# Patient Record
Sex: MALE | Race: WHITE | Employment: OTHER | URBAN - METROPOLITAN AREA
[De-identification: names, ages, dates, MRNs, and addresses within clinical notes are randomized per-mention and may not be internally consistent; named-entity substitution may affect disease eponyms.]

---

## 2017-06-17 ENCOUNTER — OFFICE VISIT (OUTPATIENT)
Dept: URGENT CARE | Facility: URGENT CARE | Age: 32
End: 2017-06-17
Payer: COMMERCIAL

## 2017-06-17 VITALS
RESPIRATION RATE: 14 BRPM | HEIGHT: 67 IN | TEMPERATURE: 98 F | BODY MASS INDEX: 28.25 KG/M2 | WEIGHT: 180 LBS | SYSTOLIC BLOOD PRESSURE: 102 MMHG | HEART RATE: 84 BPM | DIASTOLIC BLOOD PRESSURE: 64 MMHG

## 2017-06-17 DIAGNOSIS — S61.208A AVULSION OF SKIN OF INDEX FINGER, INITIAL ENCOUNTER: Primary | ICD-10-CM

## 2017-06-17 PROCEDURE — 99212 OFFICE O/P EST SF 10 MIN: CPT | Performed by: FAMILY MEDICINE

## 2017-06-17 NOTE — MR AVS SNAPSHOT
After Visit Summary   6/17/2017    Ilia Larios    MRN: [id number]           Patient Information     Date Of Birth          1985        Visit Information        Provider Department      6/17/2017 8:25 PM Miguelangel Rojas MD [facility]        Today's Diagnoses     Avulsion of skin of index finger, initial encounter    -  1      Care Instructions      Skin Tear (Skin Avulsion)  A skin avulsion is a tearing of the top layer of skin. This commonly happens after a fall or other injury. It also tends to be more common in older people, or those taking blood thinners or steroids for long periods of time.  Home care  These guidelines will help you care for your wound at home:    Keep the wound clean and dry for the first 24 to 48 hours, or as your healthcare provider advises.    If there is a dressing or bandage, change it when it gets wet or dirty. Otherwise, leave it on for the first 24 hours, then change it once a day or as often as the doctor says.    If stitches or staples were used, check the wound every day.    After taking off the dressing, wash the area gently with soap and water. Clean as close to the stitches as you can. Avoid washing or rubbing the stitches directly.    After 3 days you can keep the bandages off the wound, unless told otherwise, or there is continued drainage.  Allow the wound to be open to the air.    Keep a thin layer of antibiotic ointment on the cut. This will keep the wound clean, make it easier to remove the stitches, and reduce scarring.    If your wound is oozing, you can put a nonstick dressing over it. Then, reapply the bandage or dressing as you were told.    You can shower as usual after the first 24 hours, but don't soak the area in water (no baths or swimming) until the stitches or staples are taken out.    If surgical tape was used, keep the area clean and dry. If it becomes wet, blot it dry with a clean towel.    If skin glue was used,  don't put any creams, lotions, or antibiotic ointments on it. These can dissolve the glue. Usually the glue will flake off in about 5 to 10 days by itself. Try to resist picking it off before that so the wound doesn't open up. When it gets wet, pat it dry.  Here is some information about medicine:    You may use over-the-counter medicine such as acetaminophen or ibuprofen to control pain, unless another pain medicine was given. If you have chronic liver or kidney disease or ever had a stomach ulcer or gastrointestinal bleeding, talk with your doctor before using these medicines.    If you were given antibiotics, take them until they are all used up. It is important to finish the antibiotics even if the wound looks better. This will ensure that the infection has cleared.  Follow-up care  Follow up with your healthcare provider, or as advised.    Watch for any signs of infection, such as increasing redness, swelling, or pus coming out. If this happens, don't wait for your scheduled visit. Instead, see a doctor sooner.    Stitches or staples are usually taken out within 5 to 14 days. This varies depending on what part of your body they are on, and the type of wound. The doctor will tell you how long stitches should be left in.     If surgical tape was used, it is usually left on for 7 to 10 days. You can remove surgical tape after that unless you were told otherwise. If you try to remove it, and it is too hard, soaking can help. Surgical tape strips will eventually fall off on their own. If the edges of the cut pull apart, stop removing the tape or strips and follow up with your doctor    As mentioned above, skin glue will flake off by itself in 5 to 10 days, so you don't need to pull it off.  If any X-rays were done, you will be notified of any changes that may affect your care.  When to seek medical advice  Call your healthcare provider right away if any of these occur:    Increasing pain in the wound    Redness,  "swelling, or pus coming from the wound    Fever of 100.4 F (38 C) or higher, or as directed by your healthcare provider    Sutures or staples come apart or fall out before your next appointment and the wound edges look as if they will re-open    Surgical tape closures fall off before 7 days, and the wound edges look as if they will re-open    Bleeding not controlled by direct pressure  Date Last Reviewed: 9/1/2016 2000-2017 [employer]. [address] [place] [address]. All rights reserved. This information is not intended as a substitute for professional medical care. Always follow your healthcare professional's instructions.                Follow-ups after your visit        Who to contact     If you have questions or need follow up information about today's clinic visit or your schedule please contact [facility] directly at [phone].  Normal or non-critical lab and imaging results will be communicated to you by MyChart, letter or phone within 4 business days after the clinic has received the results. If you do not hear from us within 7 days, please contact the clinic through kidthinghart or phone. If you have a critical or abnormal lab result, we will notify you by phone as soon as possible.  Submit refill requests through [employer] or call your pharmacy and they will forward the refill request to us. Please allow 3 business days for your refill to be completed.          Additional Information About Your Visit        MyChart Information     [employer] lets you send messages to your doctor, view your test results, renew your prescriptions, schedule appointments and more. To sign up, go to [email or web address] . Click on \"Log in\" on the left side of the screen, which will take you to the Welcome page. Then click on \"Sign up Now\" on the right side of the page.     You will be asked to enter the access code listed below, as well as some personal information. Please " "follow the directions to create your username and password.     Your access code is: [email or web address]  Expires: 9/15/2017  8:57 PM     Your access code will  in 90 days. If you need help or a new code, please call your [place] clinic or [phone].        Care EveryWhere ID     This is your Care EveryWhere ID. This could be used by other organizations to access your [place] medical records  [id number]        Your Vitals Were     Pulse Temperature Respirations Height BMI (Body Mass Index)       84 98  F (36.7  C) 14 5' 7\" (1.702 m) 28.19 kg/m2        Blood Pressure from Last 3 Encounters:   17 102/64    Weight from Last 3 Encounters:   17 180 lb (81.6 kg)              Today, you had the following     No orders found for display       Primary Care Provider    None Specified       No primary provider on file.        Thank you!     Thank you for choosing [facility]  for your care. Our goal is always to provide you with excellent care. Hearing back from our patients is one way we can continue to improve our services. Please take a few minutes to complete the written survey that you may receive in the mail after your visit with us. Thank you!             Your Updated Medication List - Protect others around you: Learn how to safely use, store and throw away your medicines at www.disposemymeds.org.      Notice  As of 2017  8:57 PM    You have not been prescribed any medications.      "

## 2017-06-18 NOTE — PATIENT INSTRUCTIONS
Skin Tear (Skin Avulsion)  A skin avulsion is a tearing of the top layer of skin. This commonly happens after a fall or other injury. It also tends to be more common in older people, or those taking blood thinners or steroids for long periods of time.  Home care  These guidelines will help you care for your wound at home:    Keep the wound clean and dry for the first 24 to 48 hours, or as your healthcare provider advises.    If there is a dressing or bandage, change it when it gets wet or dirty. Otherwise, leave it on for the first 24 hours, then change it once a day or as often as the doctor says.    If stitches or staples were used, check the wound every day.    After taking off the dressing, wash the area gently with soap and water. Clean as close to the stitches as you can. Avoid washing or rubbing the stitches directly.    After 3 days you can keep the bandages off the wound, unless told otherwise, or there is continued drainage.  Allow the wound to be open to the air.    Keep a thin layer of antibiotic ointment on the cut. This will keep the wound clean, make it easier to remove the stitches, and reduce scarring.    If your wound is oozing, you can put a nonstick dressing over it. Then, reapply the bandage or dressing as you were told.    You can shower as usual after the first 24 hours, but don't soak the area in water (no baths or swimming) until the stitches or staples are taken out.    If surgical tape was used, keep the area clean and dry. If it becomes wet, blot it dry with a clean towel.    If skin glue was used, don't put any creams, lotions, or antibiotic ointments on it. These can dissolve the glue. Usually the glue will flake off in about 5 to 10 days by itself. Try to resist picking it off before that so the wound doesn't open up. When it gets wet, pat it dry.  Here is some information about medicine:    You may use over-the-counter medicine such as acetaminophen or ibuprofen to control pain,  unless another pain medicine was given. If you have chronic liver or kidney disease or ever had a stomach ulcer or gastrointestinal bleeding, talk with your doctor before using these medicines.    If you were given antibiotics, take them until they are all used up. It is important to finish the antibiotics even if the wound looks better. This will ensure that the infection has cleared.  Follow-up care  Follow up with your healthcare provider, or as advised.    Watch for any signs of infection, such as increasing redness, swelling, or pus coming out. If this happens, don't wait for your scheduled visit. Instead, see a doctor sooner.    Stitches or staples are usually taken out within 5 to 14 days. This varies depending on what part of your body they are on, and the type of wound. The doctor will tell you how long stitches should be left in.     If surgical tape was used, it is usually left on for 7 to 10 days. You can remove surgical tape after that unless you were told otherwise. If you try to remove it, and it is too hard, soaking can help. Surgical tape strips will eventually fall off on their own. If the edges of the cut pull apart, stop removing the tape or strips and follow up with your doctor    As mentioned above, skin glue will flake off by itself in 5 to 10 days, so you don't need to pull it off.  If any X-rays were done, you will be notified of any changes that may affect your care.  When to seek medical advice  Call your healthcare provider right away if any of these occur:    Increasing pain in the wound    Redness, swelling, or pus coming from the wound    Fever of 100.4 F (38 C) or higher, or as directed by your healthcare provider    Sutures or staples come apart or fall out before your next appointment and the wound edges look as if they will re-open    Surgical tape closures fall off before 7 days, and the wound edges look as if they will re-open    Bleeding not controlled by direct pressure  Date  Last Reviewed: 9/1/2016 2000-2017 [employer]. [address] [place] [address], [place] [address]. All rights reserved. This information is not intended as a substitute for professional medical care. Always follow your healthcare professional's instructions.

## 2017-06-18 NOTE — PROGRESS NOTES
"SUBJECTIVE:  Ilia Larios, a 31 year old male scheduled an appointment to discuss the following issues:  Avulsion of skin of index finger, initial encounter    Medical, social, surgical, and family histories reviewed.        ROS:  See HPI.  No nausea/vomiting.  No fever/chills.  No chest pain/SOB.  No BM/urine problems.  No syncope.      OBJECTIVE:  /64  Pulse 84  Temp 98  F (36.7  C)  Resp 14  Ht 5' 7\" (1.702 m)  Wt 180 lb (81.6 kg)  BMI 28.19 kg/m2  EXAM:  GENERAL APPEARANCE: alert and mild distress  EYES: Eyes grossly normal to inspection, PERRL and conjunctivae and sclerae normal  HENT: ear canals and TM's normal and nose and mouth without ulcers or lesions  NECK: no adenopathy, no asymmetry, masses, or scars and thyroid normal to palpation  RESP: lungs clear to auscultation - no rales, rhonchi or wheezes  CV: regular rates and rhythm, normal S1 S2, no S3 or S4 and no murmur, click or rub  LYMPHATICS: normal ant/post cervical and supraclavicular nodes  ABDOMEN: soft, nontender, without hepatosplenomegaly or masses and bowel sounds normal  MS: extremities normal- no gross deformities noted  SKIN: no suspicious lesions or rashes  NEURO: Normal strength and tone, mentation intact and speech normal    ASSESSMENT/PLAN:  (V70.895E) Avulsion of skin of index finger, initial encounter  (primary encounter diagnosis)  Plan:       "

## 2017-06-18 NOTE — NURSING NOTE
"Chief Complaint   Patient presents with     Urgent Care     Laceration     cut to right 2nd finger about 2 hours ago, piece missing of finger. Last td       Initial /64  Pulse 84  Temp 98  F (36.7  C)  Resp 14  Ht 5' 7\" (1.702 m)  Wt 180 lb (81.6 kg)  BMI 28.19 kg/m2 Estimated body mass index is 28.19 kg/(m^2) as calculated from the following:    Height as of this encounter: 5' 7\" (1.702 m).    Weight as of this encounter: 180 lb (81.6 kg).  Medication Reconciliation: complete  "

## 2017-06-25 NOTE — PROGRESS NOTES
"SUBJECTIVE:  Ilia Larios, a 31 year old male scheduled an appointment to discuss the following issues:  Avulsion of skin of index finger, initial encounter    Medical, social, surgical, and family histories reviewed.       Urgent Care      Laceration  cut to right 2nd finger about 2 hours ago, piece missing of finger. Last td      While using a metal tool, accidentally sliced off a superficial piece of skin (1cm in diameter) at ventral aspect of right index finger.  Bleeding has stopped with pressure.  Last Tdap 2 years ago.  No loss of function of fingers, normal movement.  No head/neck/trunk injuries.    ROS:  See HPI.  No nausea/vomiting.  No fever/chills.  No chest pain/SOB.  No BM/urine problems.  No syncope.      OBJECTIVE:  /64  Pulse 84  Temp 98  F (36.7  C)  Resp 14  Ht 5' 7\" (1.702 m)  Wt 180 lb (81.6 kg)  BMI 28.19 kg/m2  EXAM:  GENERAL APPEARANCE: alert and mild distress  EYES: Eyes grossly normal to inspection, PERRL and conjunctivae and sclerae normal  HENT: ear canals and TM's normal and nose and mouth without ulcers or lesions  NECK: no adenopathy, no asymmetry, masses, or scars and thyroid normal to palpation  RESP: lungs clear to auscultation - no rales, rhonchi or wheezes  CV: regular rates and rhythm, normal S1 S2, no S3 or S4 and no murmur, click or rub  MS & SKIN: right index finger distal phalynx volar aspect showed a longitudinal wound about 1cm in size, dermal depth skin avulsion---this wound was cleansed with clean running tap water and sterilized with Hibicleanse; gel foam applied with some pressure and adaptic dry non-stick sterile finger dressing applied pt able to move right index finger and other fingers normally.  No tendon involvement.  NEURO: Normal strength and tone, mentation intact and speech normal    ASSESSMENT/PLAN:  (Q37.448A) Avulsion of skin of index finger, initial encounter  (primary encounter diagnosis)  Plan:  Wound cleansed and gel foam applied, dry " adaptic dressing applied  Wound care instructions given. Pt to f/up PCP within 1 week for recheck, sooner if no improvement or worsening.  Warning signs and symptoms explained.

## 2024-04-11 ENCOUNTER — TRANSCRIBE ORDERS (OUTPATIENT)
Dept: OTHER | Age: 39
End: 2024-04-11

## 2024-04-11 DIAGNOSIS — R25.3 FASCICULATIONS OF MUSCLE: ICD-10-CM

## 2024-04-11 DIAGNOSIS — G62.89 PERIPHERAL MOTOR NEUROPATHY: Primary | ICD-10-CM

## 2024-04-11 DIAGNOSIS — R29.898 WEAKNESS OF LEFT UPPER EXTREMITY: ICD-10-CM

## 2024-04-12 NOTE — TELEPHONE ENCOUNTER
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 Swan   DATE OF APPT: 4/15/24   NOTES (FOR ALL VISITS) STATUS DETAILS   OFFICE NOTE from referring provider Care Everywhere Dr King Sullivan @ [facility]:  4/8/24   EMG Care Everywhere [facility]:  4/9/24   MEDICATION LIST Care Everywhere    IMAGING  (FOR ALL VISITS)     MRI (HEAD, NECK, SPINE) N/A    CT (HEAD, NECK, SPINE) N/A

## 2024-04-15 ENCOUNTER — DOCUMENTATION ONLY (OUTPATIENT)
Dept: LAB | Facility: CLINIC | Age: 39
End: 2024-04-15

## 2024-04-15 ENCOUNTER — OFFICE VISIT (OUTPATIENT)
Dept: NEUROLOGY | Facility: CLINIC | Age: 39
End: 2024-04-15
Attending: FAMILY MEDICINE
Payer: COMMERCIAL

## 2024-04-15 ENCOUNTER — PRE VISIT (OUTPATIENT)
Dept: NEUROLOGY | Facility: CLINIC | Age: 39
End: 2024-04-15

## 2024-04-15 ENCOUNTER — LAB (OUTPATIENT)
Dept: LAB | Facility: CLINIC | Age: 39
End: 2024-04-15
Payer: COMMERCIAL

## 2024-04-15 VITALS
RESPIRATION RATE: 14 BRPM | HEART RATE: 95 BPM | BODY MASS INDEX: 27.5 KG/M2 | SYSTOLIC BLOOD PRESSURE: 125 MMHG | HEIGHT: 67 IN | WEIGHT: 175.2 LBS | DIASTOLIC BLOOD PRESSURE: 79 MMHG | OXYGEN SATURATION: 100 %

## 2024-04-15 DIAGNOSIS — G12.20 MOTOR NEURON DISEASE OR SYNDROME (H): ICD-10-CM

## 2024-04-15 DIAGNOSIS — G12.20 MOTOR NEURON DISEASE OR SYNDROME (H): Primary | ICD-10-CM

## 2024-04-15 LAB
Lab: NORMAL
Lab: NORMAL
PERFORMING LABORATORY: NORMAL
PERFORMING LABORATORY: NORMAL
SPECIMEN STATUS: NORMAL
SPECIMEN STATUS: NORMAL
TEST NAME: NORMAL
TEST NAME: NORMAL

## 2024-04-15 PROCEDURE — 84182 PROTEIN WESTERN BLOT TEST: CPT | Performed by: PATHOLOGY

## 2024-04-15 PROCEDURE — 83825 ASSAY OF MERCURY: CPT | Mod: 90 | Performed by: PATHOLOGY

## 2024-04-15 PROCEDURE — 83520 IMMUNOASSAY QUANT NOS NONAB: CPT | Performed by: PATHOLOGY

## 2024-04-15 PROCEDURE — 87389 HIV-1 AG W/HIV-1&-2 AB AG IA: CPT | Performed by: PSYCHIATRY & NEUROLOGY

## 2024-04-15 PROCEDURE — 36415 COLL VENOUS BLD VENIPUNCTURE: CPT | Performed by: PATHOLOGY

## 2024-04-15 PROCEDURE — 99000 SPECIMEN HANDLING OFFICE-LAB: CPT | Performed by: PATHOLOGY

## 2024-04-15 PROCEDURE — 83516 IMMUNOASSAY NONANTIBODY: CPT | Mod: 90 | Performed by: PATHOLOGY

## 2024-04-15 PROCEDURE — 86038 ANTINUCLEAR ANTIBODIES: CPT | Performed by: PSYCHIATRY & NEUROLOGY

## 2024-04-15 PROCEDURE — 86334 IMMUNOFIX E-PHORESIS SERUM: CPT | Mod: 26 | Performed by: PATHOLOGY

## 2024-04-15 PROCEDURE — 82175 ASSAY OF ARSENIC: CPT | Mod: 90 | Performed by: PATHOLOGY

## 2024-04-15 PROCEDURE — 82525 ASSAY OF COPPER: CPT | Mod: 90 | Performed by: PATHOLOGY

## 2024-04-15 PROCEDURE — 86255 FLUORESCENT ANTIBODY SCREEN: CPT | Performed by: PATHOLOGY

## 2024-04-15 PROCEDURE — 86334 IMMUNOFIX E-PHORESIS SERUM: CPT | Performed by: PATHOLOGY

## 2024-04-15 PROCEDURE — G2211 COMPLEX E/M VISIT ADD ON: HCPCS | Performed by: PSYCHIATRY & NEUROLOGY

## 2024-04-15 PROCEDURE — 99204 OFFICE O/P NEW MOD 45 MIN: CPT | Mod: GC | Performed by: PSYCHIATRY & NEUROLOGY

## 2024-04-15 PROCEDURE — 83655 ASSAY OF LEAD: CPT | Mod: 90 | Performed by: PATHOLOGY

## 2024-04-15 RX ORDER — IBUPROFEN 400 MG/1
400 TABLET, FILM COATED ORAL EVERY 6 HOURS PRN
COMMUNITY

## 2024-04-15 RX ORDER — PSYLLIUM HUSK/CALCIUM CARB 1 G-60 MG
1 CAPSULE ORAL DAILY
COMMUNITY

## 2024-04-15 RX ORDER — CETIRIZINE HYDROCHLORIDE 10 MG/1
10 TABLET ORAL DAILY PRN
COMMUNITY

## 2024-04-15 ASSESSMENT — PAIN SCALES - GENERAL: PAINLEVEL: MILD PAIN (2)

## 2024-04-15 NOTE — PATIENT INSTRUCTIONS
Your neurological examination today did show signs of weakness in the left hand on the right leg, abnormal reflexes, and fasciculations.  There could be numerous disorders that cause this, including some treatable ones, such as Lyme disease, autoimmune disorders, spinal cord disorders, heavy metal toxicity, HIV, etc.    Another possibility is a degenerative neurological disorder called amyotrophic lateral sclerosis (ALS), or Klaudia Gehrig disease.    We will run numerous blood test today, and I would like you to get an MRI of the thoracic and lumbar spine with contrast tomorrow, in addition to the cervical spine MRI.    I would like you to come back to our clinic around May 2 to discuss the results, final diagnosis, and plan of care.

## 2024-04-15 NOTE — PROGRESS NOTES
[facility]  Neurology Consultation Note - Neuromuscular Division    Patient Name:  Ilia Larios    MRN:  [id number]   :  1985  Date of Service:  April 15, 2024  Primary care provider:  King Sullivan      Consult requested by:   King Sullivan MD  [address] [place] [address]    CC: Weakness    History of Present Illness:   lIia Larios is a 38 year old man who presents to the [facility] neuromuscular clinic in consultation for weakness and reported abundant fasciculations on recent EMG performed just 1 week ago.  He does not have any remarkable past medical history.    At baseline he is a very active and fit gentleman that was in his relatively normal health until about January or 2024.  Since that time he describes loss of dexterity and inability to perform some fine motor tasks with the left hand.  Specific things include grabbing coins off of the table or out of his pocket, gripping a fingernail clipper, or untying shoes.  He still has the ability to raise things over his head and no trouble with sustained activities against gravity with his arms.  He also describes using the Peloton bike frequently and having more difficulty with hip flexion and using powerful movements with his right leg in the last 1 to 2 months.  He also has been a few episodes of tripping on his right foot and stumbling due to right foot weakness in the last month.  He cannot specifically say how much it has changed in the last few months or compare it to when it started.  In the last 1 month he has noticed marked twitching and quivering of his left arm and to a lesser degree some moderate twitching of the right arm and right leg.  He denies any injuries or trauma that led to these events.  He denies any sensory changes such as paresthesias, numbness, or pain.  He denies bowel or bladder incontinence.  He denies changes to chewing, swallowing, choking  episodes, or weight loss.  He says his breathing has been normal and denies orthopnea.  His voice is also stable and denies slurring of words or dysarthria.  Denies drooling or extra secretions.    He works as a realtor.  There is no significant past medical history with the exception of anxiety in the last year or 2, which is worse in the last few months with his development of weakness.  Denies family history of any neurologic or myopathic disorders, and no gait impairment or severe dysfunction in any family members.  He does not smoke.  He drinks about 10 drinks of alcohol each week, usually Thursday through Sunday, and has done this for at least a year.    He underwent an EMG just 1 week prior through the [facility] system.  Independent review of this study shows normal antidromic sensory nerve conduction studies of left median, left ulnar, left radial, right sural, and right superficial peroneal nerves.  CMAP's of the left median (APB), left ulnar (ADM), right peroneal (EDB), and right tibial (AH) nerves were normal without changes to amplitude, velocity, or evidence of conduction block.  Needle EMG showed continuous fasciculations in the left deltoid, biceps brachii, triceps, left vastus lateralis, and 1+ fasciculations in the left thoracic paraspinal musculature.  Fibrillations and positive sharp waves are noted in the left tibialis anterior and medial gastrocnemius.  Neurogenic motor unit remodeling was noted during volitional activity in the left deltoid, triceps, tibialis anterior, and medial gastrocnemius.  High cervical and bulbar musculature was not tested.      ROS: A 10-point ROS was performed as per HPI.    PMH:  No past medical history on file.  No past surgical history on file.    Allergies:  No Known Allergies    Medications:    No current outpatient medications on file.    Social History:  Social History     Tobacco Use    Smoking status: Never    Smokeless tobacco: Not on file   Substance Use  "Topics    Alcohol use: Not on file       Family History:    No family history on file.      Physical Examination  Vitals: /79 (BP Location: Right arm, Patient Position: Sitting, Cuff Size: Adult Regular)   Pulse 95   Resp 14   Ht 1.7 m (5' 6.93\")   Wt 79.5 kg (175 lb 3.2 oz)   SpO2 100%   BMI 27.50 kg/m      General: Alert, interactive, moderate distress due to anxiety  HEENT: Normocephalic, atraumatic, nares patent, no oral lesions   Pulm: No increased work of breathing   Extremities: Warm and well perfused, peripheral pulses present, no edema  Musculoskeletal: No deformities of feet, hands, or limbs  Skin: Not jaundiced, no rash, but does have some moderate blanching of the skin in the hands and wrists bilaterally  Psych: Anxious    Neurologic:   Mental status: Attentive, interactive; Recalls remote and recent history with accuracy   Speech/Language: Fluent speech without paraphasic errors; No dysarthria  Cranial nerves: Visual fields intact to confrontation, Eyes conjugate on neutral gaze, Pupils 4mm and brisk, EOMI w/ normal and smooth pursuit, face expression symmetric, hearing intact to conversation, head turn strong bilaterally, palate rise symmetric, tongue/uvula midline.  Eye closure normal, lip closure normal, cheek puff normal, jaw opening normal strength.  Jaw jerk was mildly present, fasciculations of the tongue were absent.  Movement of the tongue was normal and quick in lateral directions.  No dysarthria with repetitive testing.    Motor:   Bulk: Mild to trace atrophy of the left FDI and APB bulk compared to right side, otherwise normal bulk throughout in the arms and legs  Tone: Appropriate, occasional paratonia  Spontaneous movements: Dramatic fasciculations of the proximal left upper extremity (biceps brachii, triceps, deltoid), and moderate fasciculations present in the right deltoid, biceps, triceps.  Moderate fasciculations also present in the bilateral vastus medialis, vastus " lateralis, and right gastrocnemius.  Power: *All strength assessments based on MRC grading  Pronator drift absent.    Right Left   Arm abduction 5 5   Elbow Flex 5 5   Elbow Extension 5 5   Wrist Extension 5 5   Finger Extension 5 4   FDI  5 4+   APB 5 4+   Fingertip Flexion 5 5     5 5   Hip Flexion 4+ 5   Knee Flexion 5 5   Knee Extension 5 5   Dorsiflexion  4 5   Plantarflexion 5 5   Inversion 4+ 5   Eversion 5 5   EHL 4 5     Reflexes:    Right Left   Triceps 3+ 3+   Biceps 3+ 3+   BR 3+ 3+   Edmond NR +   Pectoral Present w spread Present w spread   Patella 3+ 3+   Achilles  3+ 3+   Plantar down down   Cross adductors and spread were noticeable in the lower extremities.  He had 3 beats of clonus at the ankles bilaterally as well.    Sensory:   General sensation: General touch intact throughout  Vibratory (timed): 18 seconds in the big toes bilaterally, normal in the fingertips  Proprioception: Normal in big toes bilaterally  Pinprick: Appropriate in all limbs    Coordination:   FNF intact bilaterally without dysmetria  Finger tapping showed normal agility on the right and mildly reduced in the left hand  Foot tapping was moderately slowed in the right side and normal in the left foot.    Gait/Station:   Able to rise unassisted from a seated position.   Gait shows normal width, stride length, turn, and arm swing. Station appropriate while sitting and standing      INVESTIGATIONS:  Labs: Labs that were completed and normal include CMP, B12, T4, TSH, Lyme screen, CBC  Radiographic: MRI of the cervical, thoracic, lumbar spine pending  Electrophysiology: Performed through [facility] and showed marked fasciculations of several muscles of the left upper extremity, left vastus lateralis, and moderate amount in the thoracic paraspinals.  There is also neurogenic motor unit remodeling in the left deltoid, triceps, tibialis anterior, and medial gastrocnemius.      IMPRESSION/PLAN:  Ilia Larios is a 38 year old man who  presents with a 3 month history of weakness in the left hand, right hip flexion, and right foot dorsiflexion with an abundance of clinical fasciculations, most prominent in the left arm and right leg. Motor examination confirms SEGMENTAL weakness of the left hand (C8-T1), and right leg (L3- hip flexion, and L5- right foot dorsiflexion and inversion). Those are lower motor neuron signs. Examination also shows brisk reflexes throughout, positive Conner on the left, and 3 beats of clonus in both ankles, as well as a slight jaw jerk.  Agility of the left hand and right foot tapping were reduced. Those are UPPER motor neuron signs. There was a notable absence of sensory disturbance.  We told him  today that while differential diagnosis includes some treatable motor neuron syndromes (eg related to infections- HIV, heavy metal toxicity, autoimmune, or paraneoplastic disorders), and treatable spinal cord disorders, there is a significant probability that this is ALS (Klaudia Gehrig disease). We will perform several lab tests today, add on thoracic and lumbar spine MRIs to the cervical MRI that has already been ordered, and do a quick follow-up in ~2 weeks to go over the results of these tests, final diagnosis, and plan of care.       Patient instructions:  Your neurological examination today did show signs of weakness in the left hand on the right leg, abnormal reflexes, and fasciculations.  There could be numerous disorders that cause this, including some treatable ones, such as Lyme disease, autoimmune disorders, spinal cord disorders, heavy metal toxicity, HIV, etc.    Another possibility is a degenerative neurological disorder called amyotrophic lateral sclerosis (ALS), or Klaudia Gehrig disease.    We will run numerous blood test today, and I would like you to get an MRI of the thoracic and lumbar spine with contrast tomorrow, in addition to the cervical spine MRI.    I would like you to come back to our clinic around May 2  to discuss the results, final diagnosis, and plan of care.      Patient seen and discussed with attending Dr. Beny Lopez MD  Neuromuscular Medicine Fellow, PGY-5  [facility]      Attending addendum: Patient seen and examined with neuromuscular fellow Dr. Lopez at the [facility] on April 15, 2024.  I agree with his impression and plan which I personally reviewed and edited to reflect my own impression and findings. Total time spent on this encounter today 50 minutes of which 30 face to face, 10 in post visit note review, editing, and orders, and 10 in previsit chart review.     Ananda Swan MD, FAAN    The longitudinal plan of care for the diagnosis(es)/condition(s) as documented were addressed during this visit. Due to the added complexity in care, I will continue to support Ilai in the subsequent management and with ongoing continuity of care.

## 2024-04-15 NOTE — PROGRESS NOTES
"Rapid Response Epic Documentation     Situation:   called to first floor lab for a rapid response     Objective:        Assessment:        Pt found sitting in chair drinking juice and eating cookies. Lab staff report that he fainted during a blood draw. Pt states this has happened several times before and \"they took more blood than normal\", but he felt it coming on and was able to alert staff.     BP:  108/69     Pulse: 65  Respiration:   SPO2: 97 %  Glucose:  mg/dl  Mental Status: Alert  CMS: Intact  Stroke Scale: Not Applicable  EKG: Not Performed      Treatment:    Fluids and cookies PO      Location:          Disposition:      Released at Pt request    Protocol Used:     Other Syncope  130/64 72 96RA  124/77 72       "
No

## 2024-04-15 NOTE — NURSING NOTE
Chief Complaint   Patient presents with    New Patient    NEUROPATHY       Vitals were taken and medications were reconciled.    Asim Macias, Technician  4:27 PM  April 15, 2024

## 2024-04-15 NOTE — LETTER
4/15/2024       RE: Ilia Larios  [address]  [place] [address]     Dear Colleague,    Thank you for referring your patient, Ilia Larios, to the [facility] [place] [facility]. Please see a copy of my visit note below.    [facility]  Neurology Consultation Note - Neuromuscular Division    Patient Name:  Ilia Larios    MRN:  [id number]   :  1985  Date of Service:  April 15, 2024  Primary care provider:  King Sullivan      Consult requested by:   King Sullivan MD  [address] [place] [address]    CC: Weakness    History of Present Illness:   Ilia Larios is a 38 year old man who presents to the [facility] neuromuscular clinic in consultation for weakness and reported abundant fasciculations on recent EMG performed just 1 week ago.  He does not have any remarkable past medical history.    At baseline he is a very active and fit gentleman that was in his relatively normal health until about January or 2024.  Since that time he describes loss of dexterity and inability to perform some fine motor tasks with the left hand.  Specific things include grabbing coins off of the table or out of his pocket, gripping a fingernail clipper, or untying shoes.  He still has the ability to raise things over his head and no trouble with sustained activities against gravity with his arms.  He also describes using the Peloton bike frequently and having more difficulty with hip flexion and using powerful movements with his right leg in the last 1 to 2 months.  He also has been a few episodes of tripping on his right foot and stumbling due to right foot weakness in the last month.  He cannot specifically say how much it has changed in the last few months or compare it to when it started.  In the last 1 month he has noticed marked twitching and quivering of his left  arm and to a lesser degree some moderate twitching of the right arm and right leg.  He denies any injuries or trauma that led to these events.  He denies any sensory changes such as paresthesias, numbness, or pain.  He denies bowel or bladder incontinence.  He denies changes to chewing, swallowing, choking episodes, or weight loss.  He says his breathing has been normal and denies orthopnea.  His voice is also stable and denies slurring of words or dysarthria.  Denies drooling or extra secretions.    He works as a realtor.  There is no significant past medical history with the exception of anxiety in the last year or 2, which is worse in the last few months with his development of weakness.  Denies family history of any neurologic or myopathic disorders, and no gait impairment or severe dysfunction in any family members.  He does not smoke.  He drinks about 10 drinks of alcohol each week, usually Thursday through Sunday, and has done this for at least a year.    He underwent an EMG just 1 week prior through the [facility] system.  Independent review of this study shows normal antidromic sensory nerve conduction studies of left median, left ulnar, left radial, right sural, and right superficial peroneal nerves.  CMAP's of the left median (APB), left ulnar (ADM), right peroneal (EDB), and right tibial (AH) nerves were normal without changes to amplitude, velocity, or evidence of conduction block.  Needle EMG showed continuous fasciculations in the left deltoid, biceps brachii, triceps, left vastus lateralis, and 1+ fasciculations in the left thoracic paraspinal musculature.  Fibrillations and positive sharp waves are noted in the left tibialis anterior and medial gastrocnemius.  Neurogenic motor unit remodeling was noted during volitional activity in the left deltoid, triceps, tibialis anterior, and medial gastrocnemius.  High cervical and bulbar musculature was not tested.      ROS: A 10-point ROS was performed as  "per HPI.    PMH:  No past medical history on file.  No past surgical history on file.    Allergies:  No Known Allergies    Medications:    No current outpatient medications on file.    Social History:  Social History     Tobacco Use    Smoking status: Never    Smokeless tobacco: Not on file   Substance Use Topics    Alcohol use: Not on file       Family History:    No family history on file.      Physical Examination  Vitals: /79 (BP Location: Right arm, Patient Position: Sitting, Cuff Size: Adult Regular)   Pulse 95   Resp 14   Ht 1.7 m (5' 6.93\")   Wt 79.5 kg (175 lb 3.2 oz)   SpO2 100%   BMI 27.50 kg/m      General: Alert, interactive, moderate distress due to anxiety  HEENT: Normocephalic, atraumatic, nares patent, no oral lesions   Pulm: No increased work of breathing   Extremities: Warm and well perfused, peripheral pulses present, no edema  Musculoskeletal: No deformities of feet, hands, or limbs  Skin: Not jaundiced, no rash, but does have some moderate blanching of the skin in the hands and wrists bilaterally  Psych: Anxious    Neurologic:   Mental status: Attentive, interactive; Recalls remote and recent history with accuracy   Speech/Language: Fluent speech without paraphasic errors; No dysarthria  Cranial nerves: Visual fields intact to confrontation, Eyes conjugate on neutral gaze, Pupils 4mm and brisk, EOMI w/ normal and smooth pursuit, face expression symmetric, hearing intact to conversation, head turn strong bilaterally, palate rise symmetric, tongue/uvula midline.  Eye closure normal, lip closure normal, cheek puff normal, jaw opening normal strength.  Jaw jerk was mildly present, fasciculations of the tongue were absent.  Movement of the tongue was normal and quick in lateral directions.  No dysarthria with repetitive testing.    Motor:   Bulk: Mild to trace atrophy of the left FDI and APB bulk compared to right side, otherwise normal bulk throughout in the arms and legs  Tone: " Appropriate, occasional paratonia  Spontaneous movements: Dramatic fasciculations of the proximal left upper extremity (biceps brachii, triceps, deltoid), and moderate fasciculations present in the right deltoid, biceps, triceps.  Moderate fasciculations also present in the bilateral vastus medialis, vastus lateralis, and right gastrocnemius.  Power: *All strength assessments based on MRC grading  Pronator drift absent.    Right Left   Arm abduction 5 5   Elbow Flex 5 5   Elbow Extension 5 5   Wrist Extension 5 5   Finger Extension 5 4   FDI  5 4+   APB 5 4+   Fingertip Flexion 5 5     5 5   Hip Flexion 4+ 5   Knee Flexion 5 5   Knee Extension 5 5   Dorsiflexion  4 5   Plantarflexion 5 5   Inversion 4+ 5   Eversion 5 5   EHL 4 5     Reflexes:    Right Left   Triceps 3+ 3+   Biceps 3+ 3+   BR 3+ 3+   Edmond NR +   Pectoral Present w spread Present w spread   Patella 3+ 3+   Achilles  3+ 3+   Plantar down down   Cross adductors and spread were noticeable in the lower extremities.  He had 3 beats of clonus at the ankles bilaterally as well.    Sensory:   General sensation: General touch intact throughout  Vibratory (timed): 18 seconds in the big toes bilaterally, normal in the fingertips  Proprioception: Normal in big toes bilaterally  Pinprick: Appropriate in all limbs    Coordination:   FNF intact bilaterally without dysmetria  Finger tapping showed normal agility on the right and mildly reduced in the left hand  Foot tapping was moderately slowed in the right side and normal in the left foot.    Gait/Station:   Able to rise unassisted from a seated position.   Gait shows normal width, stride length, turn, and arm swing. Station appropriate while sitting and standing      INVESTIGATIONS:  Labs: Labs that were completed and normal include CMP, B12, T4, TSH, Lyme screen, CBC  Radiographic: MRI of the cervical, thoracic, lumbar spine pending  Electrophysiology: Performed through [facility] and showed marked  fasciculations of several muscles of the left upper extremity, left vastus lateralis, and moderate amount in the thoracic paraspinals.  There is also neurogenic motor unit remodeling in the left deltoid, triceps, tibialis anterior, and medial gastrocnemius.      IMPRESSION/PLAN:  Ilia Larios is a 38 year old man who presents with a 3 month history of weakness in the left hand, right hip flexion, and right foot dorsiflexion with an abundance of clinical fasciculations, most prominent in the left arm and right leg. Motor examination confirms SEGMENTAL weakness of the left hand (C8-T1), and right leg (L3- hip flexion, and L5- right foot dorsiflexion and inversion). Those are lower motor neuron signs. Examination also shows brisk reflexes throughout, positive Conner on the left, and 3 beats of clonus in both ankles, as well as a slight jaw jerk.  Agility of the left hand and right foot tapping were reduced. Those are UPPER motor neuron signs. There was a notable absence of sensory disturbance.  We told him  today that while differential diagnosis includes some treatable motor neuron syndromes (eg related to infections- HIV, heavy metal toxicity, autoimmune, or paraneoplastic disorders), and treatable spinal cord disorders, there is a significant probability that this is ALS (Klaudia Gehrig disease). We will perform several lab tests today, add on thoracic and lumbar spine MRIs to the cervical MRI that has already been ordered, and do a quick follow-up in ~2 weeks to go over the results of these tests, final diagnosis, and plan of care.       Patient instructions:  Your neurological examination today did show signs of weakness in the left hand on the right leg, abnormal reflexes, and fasciculations.  There could be numerous disorders that cause this, including some treatable ones, such as Lyme disease, autoimmune disorders, spinal cord disorders, heavy metal toxicity, HIV, etc.    Another possibility is a degenerative  neurological disorder called amyotrophic lateral sclerosis (ALS), or Klaudia Gehrig disease.    We will run numerous blood test today, and I would like you to get an MRI of the thoracic and lumbar spine with contrast tomorrow, in addition to the cervical spine MRI.    I would like you to come back to our clinic around May 2 to discuss the results, final diagnosis, and plan of care.      Patient seen and discussed with attending Dr. Beny Lopez MD  Neuromuscular Medicine Fellow, PGY-5  [facility]      Attending addendum: Patient seen and examined with neuromuscular fellow Dr. Lopez at the [facility] on April 15, 2024.  I agree with his impression and plan which I personally reviewed and edited to reflect my own impression and findings. Total time spent on this encounter today 50 minutes of which 30 face to face, 10 in post visit note review, editing, and orders, and 10 in previsit chart review.       The longitudinal plan of care for the diagnosis(es)/condition(s) as documented were addressed during this visit. Due to the added complexity in care, I will continue to support Ilia in the subsequent management and with ongoing continuity of care.             Again, thank you for allowing me to participate in the care of your patient.      Sincerely,    Ananda Swan MD

## 2024-04-16 LAB
ANA SER QL IF: NEGATIVE
HIV 1+2 AB+HIV1 P24 AG SERPL QL IA: NONREACTIVE

## 2024-04-17 LAB
MAYO MISC RESULT: ABNORMAL
PATH REPORT.COMMENTS IMP SPEC: NORMAL
PROT PATTERN SERPL IFE-IMP: NORMAL

## 2024-04-18 LAB
ARSENIC BLD-MCNC: <10 UG/L
COPPER SERPL-MCNC: 97.5 UG/DL
LEAD BLDV-MCNC: <2 UG/DL
MERCURY BLD-MCNC: <2.5 UG/L

## 2024-04-18 PROCEDURE — 99000 SPECIMEN HANDLING OFFICE-LAB: CPT | Performed by: PATHOLOGY

## 2024-04-18 PROCEDURE — 83655 ASSAY OF LEAD: CPT | Mod: 90 | Performed by: PATHOLOGY

## 2024-04-18 PROCEDURE — 82175 ASSAY OF ARSENIC: CPT | Mod: 90 | Performed by: PATHOLOGY

## 2024-04-18 PROCEDURE — 83825 ASSAY OF MERCURY: CPT | Mod: 90 | Performed by: PATHOLOGY

## 2024-04-20 LAB
GM1 GANGL IGG SER IA-ACNC: 3 IV
GM1 GANGL IGM SER IA-ACNC: 3 IV

## 2024-04-21 LAB
ARSENIC 24H UR-MRATE: NORMAL UG/D
ARSENIC UR-MCNC: <10 UG/L
ARSENIC/CREAT UR: NORMAL UG/G CRT
CREAT 24H UR-MRATE: 2202 MG/D
CREAT UR-MCNC: 96 MG/DL
LEAD 24H UR-MRATE: NORMAL UG/D
LEAD UR-MCNC: <5 UG/L
LEAD/CREAT UR: NORMAL UG/G CRT
MERCURY 24H UR-MRATE: NORMAL UG/D
MERCURY UR-MCNC: <2.5 UG/L
MERCURY/CREAT UR: NORMAL UG/G CRT

## 2024-04-22 LAB — MAYO MISC RESULT: NORMAL

## 2024-05-01 DIAGNOSIS — G12.20 MOTOR NEURON DISEASE OR SYNDROME (H): Primary | ICD-10-CM

## 2024-05-02 ENCOUNTER — ALLIED HEALTH/NURSE VISIT (OUTPATIENT)
Dept: NEUROLOGY | Facility: CLINIC | Age: 39
End: 2024-05-02

## 2024-05-02 ENCOUNTER — OFFICE VISIT (OUTPATIENT)
Dept: PULMONOLOGY | Facility: CLINIC | Age: 39
End: 2024-05-02
Payer: COMMERCIAL

## 2024-05-02 ENCOUNTER — THERAPY VISIT (OUTPATIENT)
Dept: OCCUPATIONAL THERAPY | Facility: CLINIC | Age: 39
End: 2024-05-02
Payer: COMMERCIAL

## 2024-05-02 ENCOUNTER — OFFICE VISIT (OUTPATIENT)
Dept: NEUROLOGY | Facility: CLINIC | Age: 39
End: 2024-05-02
Payer: COMMERCIAL

## 2024-05-02 ENCOUNTER — THERAPY VISIT (OUTPATIENT)
Dept: PHYSICAL THERAPY | Facility: CLINIC | Age: 39
End: 2024-05-02
Payer: COMMERCIAL

## 2024-05-02 ENCOUNTER — PATIENT OUTREACH (OUTPATIENT)
Dept: CARE COORDINATION | Facility: CLINIC | Age: 39
End: 2024-05-02

## 2024-05-02 VITALS
BODY MASS INDEX: 26.85 KG/M2 | OXYGEN SATURATION: 100 % | RESPIRATION RATE: 19 BRPM | HEART RATE: 121 BPM | SYSTOLIC BLOOD PRESSURE: 129 MMHG | HEIGHT: 67 IN | DIASTOLIC BLOOD PRESSURE: 84 MMHG | WEIGHT: 171.1 LBS

## 2024-05-02 DIAGNOSIS — G12.21 ALS (AMYOTROPHIC LATERAL SCLEROSIS) (H): Primary | ICD-10-CM

## 2024-05-02 DIAGNOSIS — F41.1 GAD (GENERALIZED ANXIETY DISORDER): ICD-10-CM

## 2024-05-02 DIAGNOSIS — Z74.09 IMPAIRED MOBILITY AND ADLS: ICD-10-CM

## 2024-05-02 DIAGNOSIS — Z78.9 ALTERATION IN INSTRUMENTAL ACTIVITIES OF DAILY LIVING (IADL): ICD-10-CM

## 2024-05-02 DIAGNOSIS — Z78.9 IMPAIRED MOBILITY AND ADLS: ICD-10-CM

## 2024-05-02 DIAGNOSIS — G12.20 MOTOR NEURON DISEASE OR SYNDROME (H): ICD-10-CM

## 2024-05-02 DIAGNOSIS — Z71.83 ENCOUNTER FOR NONPROCREATIVE GENETIC COUNSELING: ICD-10-CM

## 2024-05-02 PROCEDURE — 97162 PT EVAL MOD COMPLEX 30 MIN: CPT | Mod: GP | Performed by: PHYSICAL THERAPIST

## 2024-05-02 PROCEDURE — 97535 SELF CARE MNGMENT TRAINING: CPT | Mod: GO

## 2024-05-02 PROCEDURE — 99207 PR NO BILLABLE SERVICE THIS VISIT: CPT | Performed by: GENETIC COUNSELOR, MS

## 2024-05-02 PROCEDURE — 99214 OFFICE O/P EST MOD 30 MIN: CPT | Performed by: PSYCHIATRY & NEUROLOGY

## 2024-05-02 PROCEDURE — G2211 COMPLEX E/M VISIT ADD ON: HCPCS | Performed by: PSYCHIATRY & NEUROLOGY

## 2024-05-02 PROCEDURE — 97165 OT EVAL LOW COMPLEX 30 MIN: CPT | Mod: GO

## 2024-05-02 PROCEDURE — 94799 UNLISTED PULMONARY SVC/PX: CPT | Performed by: INTERNAL MEDICINE

## 2024-05-02 PROCEDURE — 97535 SELF CARE MNGMENT TRAINING: CPT | Mod: GP | Performed by: PHYSICAL THERAPIST

## 2024-05-02 PROCEDURE — 94375 RESPIRATORY FLOW VOLUME LOOP: CPT | Performed by: INTERNAL MEDICINE

## 2024-05-02 RX ORDER — RILUZOLE 50 MG/1
50 TABLET, FILM COATED ORAL EVERY 12 HOURS
Qty: 90 TABLET | Refills: 1 | Status: SHIPPED | OUTPATIENT
Start: 2024-05-02 | End: 2024-05-28

## 2024-05-02 RX ORDER — ESCITALOPRAM OXALATE 10 MG/1
TABLET ORAL
Qty: 90 TABLET | Refills: 1 | Status: SHIPPED | OUTPATIENT
Start: 2024-05-02 | End: 2024-09-11

## 2024-05-02 ASSESSMENT — PAIN SCALES - GENERAL: PAINLEVEL: MILD PAIN (2)

## 2024-05-02 NOTE — Clinical Note
2024       RE: Ilia Larios  [address]  [facility] [address]     Dear Colleague,    Thank you for referring your patient, Ilia Larios, to the [facility] NEUROLOGY [facility]INIC [place] at [facility]. Please see a copy of my visit note below.    Ilia Larios was seen for a genetic counseling appointment at the request of Dr. Swan today given his diagnosis of ALS.    Pertinent Medical History: Ilia is a 38 year old male with a history of ALS. See Dr. Swan's note for additional details.     Family History: A three generation pedigree was obtained today and scanned into the EMR. This family history is by patient report only and has not been verified with medical records except where noted. The following information is significant:   Ilia has two healthy daughters (ages 6 and 8).  Ilia has one sister (age 36) who is alive and well. She has two sons (age 13 and 11) who are alive and well.  Ilia's father (age 62) is alive and well. Ilia has two paternal uncles who are in their 60s and are healthy. Ilia's paternal grandfather  in his 70s-80s due to heart disease and spinal cancer. Ilia's paternal grandmother  in her 70s or 80s and had a history of mental health concerns. Paternal ancestry is [place].  Ilia's mother (age 70) has COPD and emphysema and a history of smoking. Ilia has two maternal uncles and one maternal aunt who are in their 60s and are alive and well. Ilia has one maternal aunt (age 57) who is alive and well. Ilia's maternal cousins have no history of neurologic concerns. Ilia's maternal grandfather  in his early 70s due to COPD and had a history of smoking. Ilia's maternal grandmother is in her 80s and is alive and well. Maternal ancestry is [place] and [place].   Family history is otherwise negative for ALS, dementia, Parkinson's disease and mental health concerns. Consanguinity was  "denied.     Discussion: Amyotrophic lateral sclerosis (ALS) is a condition that affects the motor neurons.  Motor neurons are responsible for sending the necessary signals to the muscles, to allow for movement and speech. In ALS, these motor neurons break down and eventually lead to loss of speech and paralysis. Some individuals with ALS may also experience neuropsychological symptoms such as cognitive and/or behavioral dysfunction or frontotemporal dementia (FTD) in severe cases. The progression of this condition is approximately three to five years, although it can vary with age of onset and between and within families. Historically, ALS has been categorized into \"familial ALS\" when an individual has two or more close relatives with ALS and \"sporadic ALS\" when an individual with ALS has no family history of the condition. ALS is not generally thought of as a genetic or inherited condition; however recently several genetic factors have been found to be either causative or contributory. When a family history of ALS is identified then we have a strong suspicion that there is a causative genetic factor or genetic change (mutation) that predisposes members of the family to ALS. However, as more research and genetic testing has become available, individuals with seemingly sporadic ALS can have a gene mutation identified. This may be due to a new (de sharlene) mutation in that individual or reduced penetrance. Therefore, a negative family history cannot rule out a possible genetic form of ALS. About 10-15% of individuals with ALS are thought to have \"genetic ALS\" meaning a causative gene mutation has been identified.     There are several genes that have been found to be associated with ALS and/or FTD. The most common genetic cause of ALS is a hexanucleotide repeat expansion (GGGGCC) in the H8vvp60 gene. The number of hexanucleotide repeats in the H8cgd79 gene ranges from 2 to >4000. The precise cutoff of between normal and " pathogenic (disease causing) is complicated by multiple factors, but generally <25 is considered normal and >60 is considered pathogenic. Repeat expansions in the Z1hnf37 gene account for approximately 39-45% of familial cases of ALS and about 3-7% of sporadic ALS. Other genes known to cause ALS include: SOD1, FUS, TARDBP, ANG, OPTN, FIG4, VAPB, UBQLN2, SETX, NEK1, VCP, ALS2, TDP43 (CHCHD10, DCTN1, MATR3, PFN1, TUBA4A, UNC13A, ATXN2).    Genetic forms of ALS are typically inherited in an autosomal dominant pattern, meaning a change on one copy of the gene is sufficient to predispose an individual to the condition. Someone who possesses an ALS gene mutation would have a 1 in 2 (50%) chance of passing the gene mutation associated with ALS on to their children. It is import to know that not all people who inherit an ALS gene mutation will develop ALS. This is called reduced penetrance. For example, the penetrance or percent of individuals who develop ALS who have a mutation in SOD1 ranges from 50% to 90% by age 70, depending on the specific genetic mutation. It is assumed that other genes will also show reduced penetrance. We discussed that if the gene associated with ALS is NOT passed on; that child is NOT at an increased risk to develop symptoms because of this genetic change and CANNOT pass it on to their children. The only way to know if the genetic change is passed on is by genetic testing.    We discussed the availability of genetic testing for ALS. The gold standard of ALS genetic testing is to begin testing the R7taq46 gene to determine if an individual has a normal number of hexanucleotide repeats (<25) or an expanded number of hexanucleotide repeats (>60). If that testing identifies normal repeats on both copies of T8rye03, then testing can be expanded to a multi-gene panel to include the other genes associated with ALS. We went on to discuss the details, limitations, and possible outcomes of these multi-gene  panels. In particular, we discussed that there are three possible results:  Negative: meaning normal or no mutations are identified in the genes that were tested/sequenced  Positive: meaning a mutation that is known to be associated with a particular set of symptoms is identified  Variant of uncertain significance (VUS): meaning a change in the DNA sequence of a particular gene was seen but there is not enough information or data yet to know if it explains the symptoms. If a VUS is identified, testing of other relatives may be helpful to provide clarification.  In most cases, identification of a VUS does not confirm a diagnosis and does not result in any clinically actionable recommendations.    Even with the growing numbers of genes that have been identified, current clinically available genetic testing identifies a causative mutation in less than 40% families affected with familial ALS. Therefore, most of the time we cannot identify the genetic cause in individuals with ALS. If the results of genetic testing are negative, this cannot rule out the possibility that there may be an underlying genetic cause or component to an individual's ALS, as it is likely that the genetic cause of all forms of ALS have yet to be discovered. DNA banking is the process in which we can store an individual's DNA almost indefinitely to test at a later time when new genetic tests are available. Additionally, the DNA could be used in research with the participant's consent. This can be done for any genetic condition. It can also be done for conditions which are not thought to be genetic or where there isn't a known family history. Saving the sample may allow for testing as advances are made in diagnosis and treatment.  DNA banking is available through many laboratories.    We discussed the option for ALS gene panel testing via a sponsored or sponsored option. Risks benefits and limitations of these options were reviewed. Ilia  expressed an excellent understanding of this information and consented to a sponsored ALS panel.    Plan:  1. Sponsored ALS panel with I4mwv68 and ATXN2 repeat analysis at [employer] [facility]  2. Return pending results of above testing  3. Contact information was provided should any questions arise in the future.     Danielle Colvin [facility]  Genetic Counselor  Division of Genetics and Metabolism  (p) [phone]  (f) [phone]     Total time spent in consultation with the family was approximately 20 minutes      Cc: No Letter       Again, thank you for allowing me to participate in the care of your patient.      Sincerely,    Danielle Colvin, GC

## 2024-05-02 NOTE — PROGRESS NOTES
Social Work -ALS Clinic Progress Note  [facility]    Data/Intervention:    Patient Name:  Ilia Larios  /Age:  1985 (38 year old)    Visit Type: in person    Collaborated With:    -Scar  -Dr Swan and members of the ALS interdisciplinary team    Psychosocial info/Concerns:   Scar received an ALS diagnosis today. He's here alone as his wife is out of town. He feels he was prepared as much as he could be but it's difficult of course. He is anticipating talking with his wife soon and how difficult that will be. They have been together 16 years and they have 2 children ages 6, 8.   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 His health insurance is thru [employer]. She works for the [employer].     Intervention/Education/Resources Provided:  Discussed my role briefly and helped process some of his feelings today and provide support and validation. He has been anxious recently and will try an selective serotonin reuptake inhibitor to see if that helps. He is interested in counseling and offered therapists here but he prefers something closer to home to help assure he will go. Suggested contacting his insurance for providers in his area.   He reports a good support system. His family is in [facility] and hers is in [facility].   He was on a timeline today and had to  the kids at the bus so visit was brief.     Assessment/Plan:  New ALS diagnosis. Pt processing his feelings. Will continue to follow in ALS clinic    Provided patient/family with contact information and encouraged them to contact me between clinic visits as needed.     Susie Colunga, JUDITH, [facility]    [facility]  [phone]

## 2024-05-02 NOTE — PATIENT INSTRUCTIONS
Exercise recommendations with ALS    Resource for exercise recommendations: https://www.als.org/navigating-als/living-with-als/therapies-care/how-to-improve-mobility    If you are new to a form of exercise it is important to start SLOWLY and see how your body responds. Please see the directions below on how to safely exercise with ALS.    Stretching  Maintaining range of motion is important to reduce pain and tightness in weakened muscles. This should be done daily.    Your physical therapist or occupational therapist will identify a few key stretches for you to do. Each stretch should be held for 30 seconds without bouncing, 2-3 repetitions.    Cardiovascular training  Your physical therapist will help you identify the best mode of exercise for you to perform. This may change over time, so continue to follow recommendations from your therapists.    A good starting point is usually 10 mins at a slow, comfortable pace. Allow a break day in between.   - If you are more fatigued or sore, then it was too much. You need to wait until your symptoms of over-use resolve before trying again. Next time try only 5 mins and see how you feel. If symptoms persist, then likely your body won't tolerate cardiovascular training.  - If you are feeling ok the next day after doing 10 mins of exercise, then you can try it again. Repeat your exercise 3 times before increasing the time by no more than 5 mins.  - Continue to assess your symptoms along the way. If you have increased fatigue or soreness, then you will need to back up to the last level.  - The goal of cardiovascular training is continuous movement. Don't worry about increasing speed, incline, or resistance.   - Every day may be a little different depending on your fatigue and other activities you have been doing. Listen to your body and gauge your exercise routine accordingly.    Strength  Strength training of muscles that are weakened is to be avoided, as it will just over  strain them and there is potential to increase the progression of weakness in these muscles.    If you choose to strength train your non-affected  muscles, it is recommended to use a low level of weight with higher reps (10-20 reps). Make sure you have at least 1 rest day in between strength training sessions. If you notice increase in fatigue or soreness, then what you did was too much, and you should discontinue exercising those muscles.              I will place an order for AFO for your right foot to help with foot drop.       Please reach out with any questions!    Laxmi Santos DPT  Physical Therapist  [facility] [address]  [address] [place] [address]    Appointments: [phone]

## 2024-05-02 NOTE — NURSING NOTE
No chief complaint on file.      Vitals were taken and medications were reconciled.    Asim Macias, Technician  10:28 AM  May 2, 2024

## 2024-05-02 NOTE — PROGRESS NOTES
Ilia Larios comes into clinic today at the request of Dr. Swan Ordering Provider for PFT      Jose Manuel Fu, RRT

## 2024-05-02 NOTE — PROGRESS NOTES
"OCCUPATIONAL THERAPY EVALUATION  Type of Visit: Evaluation    See electronic medical record for Abuse and Falls Screening details.    Subjective      Presenting condition or subjective complaint:   L hand pinch/ is weaker which led him to get more testing w/ confirmation of ALS this date. Pt reports difficulty w/ middle finger in L hand with typing. Pt also reports feeling more fatigue w/ activity. Reported needing time to recover.    Date of onset:  5/2/24    Relevant medical history:   N/A  Dates & types of surgery:      Prior diagnostic imaging/testing results:       Prior therapy history for the same diagnosis, illness or injury:    None     Prior Level of Function  Transfers: Independent  Ambulation: Independent  ADL: Independent  IADL: Childcare, Driving, Finances, Housekeeping, Laundry, Meal preparation, Medication management, Work    Living Environment  Social support:   Wife and kids.  Type of home:   2 story home. Bedroom and bathroom upstairs but pt can stay in a small office and half bath if condition progresses.   Stairs to enter the home:         Ramp:     Stairs inside the home:         Help at home:  None    Equipment owned:   None     Employment:    Full time realtor  Hobbies/Interests:      Patient goals for therapy:   Be able to work as long as I am able to.    Pain assessment: Pain denied     Objective      NEURO CLINIC EVALUATION    Others present at visit: N/A    Cardio-respiratory status: None assessed this date.    Height/Weight: 171 lbs, 5'7\"    Technology used: Cell phone    ALS FRS:  See in screening/synopsis. Completed this date.    Evaluation   Interview completed.     Range of motion:  WNL symetrically.     Manual muscle testing: Shoulder/biceps 4+/5 symmetrically. Fairly strong R hand grasp. Fair grasp on LUE but < strength than RUE. Weak herring and lateral pinch in LUE verse RUE.    Cognition:  Intact, no concerns.     ADL:   Feeding self:  Independent  Grooming:  Independent  UE " Dressing:   Independent  LE Dressing:   Independent  Showering/bathing:  Independent  Toileting/transfer:   Independent  Functional Mobility:  Independent    IADL:   Home management:   Independent  Driving:   Independent  Occupation:  Independent, still working full time.   Leisure:  Independent  Emergency Call system:Cell phone       Recommended Interventions: self care/home management    Treatment provided this date:   Self care/home management, 10 minutes      Provided education on ALSA loan equipment pool and possible needs for AE as condition progresses. Also discussed possible progression of disease w/ possible need for mobility aide and aide with self cares. Also discussed energy management strategies such as adding rest breaks into his daily routine to prevent needing a longer time to recover or getting overfatigued.   Educated pt on contraindications w/ exercising as disease progresses but recommend functional activity with extremities that patient can continue to engage in.   Recommended keyboarding options such as eye gaze or adaptations on his computer to slow down key strike as an option if needed. Pt reports he can also choose to use voice recording option should typing become more difficult.      Response to treatment/recommendations: Pt was receptive to education.     Goal attainment:  Goals:   Target date: Today  Patient, family and/or caregiver will verbalize understanding of evaluation results and implications for functional performance.  Patient, family and/or caregiver will verbalize/demonstrate understanding of compensatory methods /equipment to enhance functional independence and safety.  Patient, family and/or caregiver will verbalize energy management techniques appropriate for status and setting.    Educational assessment/barriers to learning:   No barriers noted    Total Evaluation Time (Minutes): 5 mins   Timed Code Treatment Minutes: 10 mins  Total Treatment Time (sum of timed and  untimed services): 15 mins    Assessment & Plan   CLINICAL IMPRESSIONS  Medical Diagnosis:   ALS    Treatment Diagnosis:  Impaired mobility/IALS/ADLs   Impression/Assessment: Pt is a 38 year old male presenting to Occupational Therapy due to newly diagnosed ALS.  The following significant findings have been identified: Impaired activity tolerance, Impaired ROM, and Impaired strength.  These identified deficits interfere with their ability to perform work tasks and care of others as compared to previous level of function.     Clinical Decision Making (Complexity):  Assessment of Occupational Performance: 1-3 Performance Deficits  Occupational Performance Limitations: child rearing, health management and maintenance, work, and play  Clinical Decision Making (Complexity): Low complexity    Recommended Referrals to Other Professionals: None clinically indicated at this time.   Education Assessment:   Pt educated on role of OT and POC.     Risks and benefits of evaluation/treatment have been explained.   Patient/Family/caregiver agrees with Plan of Care.      Signing Clinician: Tsering Marin, OTR

## 2024-05-02 NOTE — PROGRESS NOTES
"King Sullivan MD (PCP)  [place], May 2, 2024    Dear Dr Sullivan,    I had the pleasure of seeing Ilia in follow-up at the [facility] today. This is a 38-year-old man who first presented to my office on April 15 with concerns about muscle twitching, onset of mild weakness and loss of dexterity of the left hand around January to February 2024, and then shortly thereafter fasciculations of the right leg and foot and slowing of his gait.  Examination showed mild segmental left distal upper and right lower extremity weakness in several muscle groups, with pathologic hyperreflexia, and widespread fasciculations.  An EMG study done through the [facility] system showed widespread fasciculations in multiple muscle groups, fibrillations in a few muscles of the left leg and neurogenic recruitment and MUP changes in several muscles.    I suspected ALS from the beginning, but I wanted to perform an extensive workup to rule out treatable mimics. The patient recently had MRI of the cervical, thoracic, lumbar spine with contrast, vitamin B12 levels, TSH, CBC, CMP, Lyme screening, blood and urine heavy metals, GM1 antibodies, paraneoplastic antibodies, copper levels, serum protein immunofixation, FRIDA, which were all negative or normal.  Plasma neurofilament light chain levels were elevated, which is typically seen in a number of neurodegenerative disorders like ALS.      /84 (BP Location: Right arm, Patient Position: Sitting, Cuff Size: Adult Regular)   Pulse (!) 121   Resp 19   Ht 1.71 m (5' 7.32\")   Wt 77.6 kg (171 lb 1.6 oz)   SpO2 100%   BMI 26.54 kg/m          Latest Ref Rng & Units 5/2/2024     9:34 AM   PFT   FVC L 5.16  P   FEV1 L 4.31  P   FVC% % 117  P   FEV1% % 119  P      P Preliminary result     Neuro exam was not repeated.     In summary, I explained to Mr. Larios that unfortunately the diagnosis is almost certainly limb onset ALS.  " He has upper and lower motor neuron signs in 2 regions, evidence of EMG abnormalities indicating denervation in 3 regions, no sensory abnormalities, and no better explanation of his symptoms following extensive imaging and lab studies.    I told him that he is welcome to seek a second neurological opinion to confirm the diagnosis, either in [place] [facility], etc) or [place] ([place]     We discussed the natural history, diagnosis and prognosis of ALS at some length.  I offered him medications that can prolong survival in ALS, including riluzole and Radicava ORS.  He is interested in starting them. He will need AST and ALT monitored monthly during the first 3 months of treatment.    We discussed the possibility of enrollment in research studies.  Given his young age of ALS, it is absolutely appropriate to offer him genetic testing, and I will suggest that he meets with our genetic counselor.  He should see our physical and occupational therapist, and they will advise him on optimal exercises, and whether he needs any gait assistance equipment such as AFOs.  He has moderate right foot dorsiflexion weakness, and an AFO is likely appropriate in this context.    Mr Larios has had worsening anxiety disorder in the last months, and a new ALS diagnosis certainly will not make it easier.  I will offer him escitalopram 10 mg daily, and the dose can be increased to 20 mg after a month if the symptoms do not improve.  I warned him of common side effects of SSRI during treatment initiation.  It would also be appropriate for him to seek mental health counseling locally.    I will see him in follow-up in 3 months, sooner if needed.  His pulmonary function tests are excellent and he has no bulbar, respiratory symptoms, or nutritional needs.  I emphasized the importance of high-calorie diet in ALS and maintenance of weight.    Sincerely,    Ananda Swan MD, FAAN      Total time spent on this encounter  today 35 minutes of which 20 face-to-face, 10 in postvisit note dictation, editing, and orders, and 5 in previsit chart review.    The longitudinal plan of care for the diagnosis(es)/condition(s) as documented were addressed during this visit. Due to the added complexity in care, I will continue to support Ilia in the subsequent management and with ongoing continuity of care: ALS

## 2024-05-02 NOTE — PATIENT INSTRUCTIONS
Will start riluzole for ALS, and Cara will give you the benefit investigation forms for Radicava ORS to sign (the second FDA-approved drug for ALS)    I will give you an order for AST and ALT liver function test, which should be checked monthly after starting riluzole.    You will see our physical and occupational therapist, and our genetic counselor today.    I will start you on Lexapro 10 mg daily for anxiety disorder.  The dose can be increased to 20 mg after 1 month.  Common side effects of SSRI include loss of appetite, irritability, insomnia, and mild nausea, as well as sexual dysfunction.  Most of those side effects tend to improve after the first 3 to 4 weeks of treatment.    Follow up in 3 months in our clinic, sooner if needed.    Please call Cara @ [phone] for questions or concerns during regular business hours. For a more efficient way to communicate, use USDS and address the message to your physician. Remember, [employer] is only read during business hours. Do not leave urgent messages on voicemail or USDS. If situation is urgent, contact the Neurology Clinic @ [phone] and ask to speak to a Triage Nurse or Call 911 or visit an Emergency Department.    Please call your pharmacy if you need a medication refill. They will send us an electronic message.

## 2024-05-02 NOTE — PROGRESS NOTES
"CLINICAL NUTRITION SERVICES - ASSESSMENT NOTE    Ilia Larios 38 year old referred for MNT related to ALS    Visit Type: initial  Referring Physician: Dr. Swan  Pt accompanied by: self    NUTRITION HISTORY  Factors affecting nutrition intake include:N/A  Current diet: Regular  Current appetite/intake: Good appetite. 3 meals per day, plus snacks.  Bowel movements: Typically struggles with constipation. Has taken Metamucil for the past year. However, due to stress and anxiety causing diarrhea lately, Scar has temporarily stopped taking Metamucil.  PEG Tube: N/A    Diet Recall  Breakfast Protein shake (whey isolate protein) - first thing in the morning  3 eggs, toast, banana - mid morning   Lunch Salad (sometimes with protein), wraps, leftovers   Dinner Protein (pork, chicken, burger), rice and beans, pasta, ground beef tacos, veggies   Snacks A.m. - apple or Greek yogurt, p.m.- trail mix, protein bar, plantain chips, apple, banana, or andrez, after dinner - popcorn or chips occasionally   Beverages Water, coffee, Bubbly, [place], Oat or [place] milk (doesn't like Cow's milk), cut down on alcohol this past November, completely eliminated it in February     ANTHROPOMETRICS  Height: 1.71 m (5' 7.32\")   Weight: 77.6 kg (171 lb 1.6 oz)   BMI: 26.54 kg/m    Weight Status:  Overweight BMI 25-29.9  IBW: 67.3 kg  % IBW: 115%  Weight History: 4# (2%) wt loss noted in the past month  Wt Readings from Last 10 Encounters:   05/02/24 77.6 kg (171 lb 1.6 oz)   04/15/24 79.5 kg (175 lb 3.2 oz)   06/17/17 81.6 kg (180 lb)     Dosing Weight: 78 kg    Medications/vitamins/minerals/herbals:   Reviewed    LABS  Labs reviewed    ASSESSED NUTRITION NEEDS:  Estimated Energy Needs: 1080-9304 kcals (25-30 Kcal/Kg)  Justification: increased needs associated with progression of ALS  Estimated Protein Needs: 75-95 grams protein (1-1.2 g pro/Kg)  Justification: increased needs associated with progression of ALS  Estimated Fluid Needs: " 1950  mL   Justification: maintenance    NUTRITION DIAGNOSIS:  Predicted inadequate nutrient intake related to increased needs associated with progression of ALS    INTERVENTIONS  Recommendations / Nutrition Prescription  1. Continue Regular diet  2. Continue drinking one protein shake per day    Nutrition Education     Provided written & verbal education on:   - Discussed calorie and protein needs for maintenance of weight and nutrition status.      Pt verbalize understanding of materials provided during consult.   Patient Understanding: Excellent  Expected Compliance: Excellent     Implementation  Composition of Meals and Snacks and General/healthful diet    Goals  1.  Aim for 3 meals plus snacks  2.  Weight maintenance    Follow-Up Plans: Pt has RD contact information for questions.    Pt encouraged to follow up with RD at next clinic visit in 3-6 months.    MONITORING AND EVALUATION:  -Food intake  -Fluid/beverage intake  -Weight trends    Adele Newman RDN, LD

## 2024-05-02 NOTE — PROGRESS NOTES
PHYSICAL THERAPY EVALUATION  Type of Visit: Evaluation    See electronic medical record for Abuse and Falls Screening details.    Subjective       Presenting condition or subjective complaint:    Patient was seen today in ALS clinic. Symptoms first started in January/February 2024 with onset of weakness and loss of dexterity in the left hand, fasciculations, and right LE weakness. He reports some right ankle weakness, left hand/forearm weakness. If he is moving too quickly he feels he could trip.   Date of onset: 05/02/24    Relevant medical history:     Dates & types of surgery:      Prior diagnostic imaging/testing results:       Prior therapy history for the same diagnosis, illness or injury:        Prior Level of Function  Transfers: Independent  Ambulation: Independent  ADL: Independent  IADL: Childcare, Driving, Finances, Meal preparation, Medication management, Work    Living Environment  Social support:   Spouse and kids  Type of home:    2 story home. Bedroom and bathroom upstairs but pt can stay in a small office and half bath if condition progresses.   Stairs to enter the home:         Ramp:     Stairs inside the home:         Help at home:   none  Equipment owned:   None    Employment:     Realtor  Hobbies/Interests:   Staying active, playing golf, alternates between strength training/cardio, being with family    Patient goals for therapy:   Understand what he can do for exercise now, understand what he will need down the road    Pain assessment: Pain denied     Objective    NEURO CLINIC EVALUATION    Others present at visit: None    Cardio-respiratory status: Forced vital capacity: 117 % of predicted     Height/Weight: 5/7/ 171 lbs    Technology used: computer, smart phone    ALS FRS:  44/48    Evaluation   Interview completed.     Range of motion: WNL       Manual muscle testing:  R ankle DF 4-/5, R hip flexion 4+/5, all other MMT in LEs is 5/5     5 Time Sit to Stand: 8.27 sec     10 Meter Walk: 5.26  sec     Romber sec EO/EC; Sharpened Romberg EO 30 sec, EC 12 sec.     Gait:  Patient ambulates independently with no device, foot slap on R LE, pelvic instability.  Gait speed is 1.14 m/sec.       Cognition:  Intact    Recommended Interventions: ADL/Self Care/Management    Treatment provided this date:   ADL/Self Care Management: 10 min  -Educated patient on energy conservation techniques including pacing of activities, frequent rest breaks, use of assistive device and monitoring signs of fatigue (increased muscle soreness, weakness, cramps, fasciculations) for safe functional mobility and performance of daily tasks  -Educated patient on exercise recommendations including focus on stretching and light cardiovascular conditioning as main modes. Educated on safe strengthening principles including endurance focus for muscle groups that have anti gravity strength. Educated patient to monitor signs for red flags after exercise (increased weakness, cramping, fasciculations) that last >30 minutes.  -Educated patient on fall risk reduction techniques including energy conservations, monitoring signs of fatigue, performance of single tasks with rest breaks in between activities and use of assisted devices. Recommended AFO for right LE for foot drop. Order requested from provider.       Response to treatment/recommendations: Patient was receptive and verbalized understanding of all recommendations.    Goal attainment:  All goals met    Total Evaluation Time (Minutes): 13  Timed Code Treatment Minutes: 10  Total Treatment Time (sum of timed and untimed services): 23    Assessment & Plan   CLINICAL IMPRESSIONS  Medical Diagnosis: ALS    Treatment Diagnosis: Force Production Deficit   Impression/Assessment: Patient is a 38 year old male with complaints of muscle weakness and increased fatigue.  The following significant findings have been identified: Decreased strength, Impaired gait, Impaired muscle performance, Decreased  activity tolerance, and Instability. These impairments interfere with their ability to perform self care tasks, work tasks, recreational activities, household chores, and community mobility as compared to previous level of function.     Clinical Decision Making (Complexity):  Clinical Presentation: Evolving/Changing  Clinical Presentation Rationale: based on medical and personal factors listed in PT evaluation  Clinical Decision Making (Complexity): Moderate complexity    PLAN OF CARE  Treatment Interventions:  Interventions: Gait Training, Neuromuscular Re-education, Therapeutic Activity, Therapeutic Exercise, Self-Care/Home Management    Long Term Goals     PT Goal 1  Goal Identifier: Understand evaluation  Goal Description: Patient, family and/or caregiver will verbalize understanding of evaluation results and implications for functional performance.  Target Date: 05/02/24  Date Met: 05/02/24  PT Goal 2  Goal Identifier: Compensatory methods/equipment  Goal Description: Patient, family and/or caregiver will verbalize/demonstrate understanding of compensatory methods/equipment to enhance functional independence and safety  Goal Progress: 5/2/2024  Target Date: 05/02/24  PT Goal 3  Goal Identifier: Positioning/equipment  Goal Description: Patient, family and/or caregiver will verbalize/demonstrate understanding of positioning techniques/equipment  Target Date: 05/02/24  Date Met: 05/02/24  PT Goal 4  Goal Identifier: HEP  Goal Description: Patient, family and/or caregiver will verbalize/demonstrate understanding of home program  Target Date: 05/02/24  Date Met: 05/02/24  PT Goal 5  Goal Identifier: Energy Management  Goal Description: Patient, family and/or caregiver will verbalize energy management techniques appropriate for satus and setting  Target Date: 05/02/24  Date Met: 05/02/24      Frequency of Treatment: 1 time  Duration of Treatment: 1 day    Recommended Referrals to Other Professionals:  Referral to  orthotics/prosthetics for R AFO  Education Assessment:   Learner/Method: Patient;Caregiver;Listening;Pictures/Video    Risks and benefits of evaluation/treatment have been explained.   Patient/Family/caregiver agrees with Plan of Care.     Evaluation Time:     PT Eval, Moderate Complexity Minutes (19702): 13       Signing Clinician: Monica Santos PT

## 2024-05-02 NOTE — LETTER
"5/2/2024       RE: Ilia Larios  [address]  [facility] [address]       Dear Colleague,    Thank you for referring your patient, Ilia Larios, to the [facility] [place] [facility]. Please see a copy of my visit note below.    King Sullivan MD (PCP)  [place], May 2, 2024    Dear Dr Sullivan,    I had the pleasure of seeing Ilia in follow-up at the [facility] today. This is a 38-year-old man who first presented to my office on April 15 with concerns about muscle twitching, onset of mild weakness and loss of dexterity of the left hand around January to February 2024, and then shortly thereafter fasciculations of the right leg and foot and slowing of his gait.  Examination showed mild segmental left distal upper and right lower extremity weakness in several muscle groups, with pathologic hyperreflexia, and widespread fasciculations.  An EMG study done through the [employer] system showed widespread fasciculations in multiple muscle groups, fibrillations in a few muscles of the left leg and neurogenic recruitment and MUP changes in several muscles.    I suspected ALS from the beginning, but I wanted to perform an extensive workup to rule out treatable mimics. The patient recently had MRI of the cervical, thoracic, lumbar spine with contrast, vitamin B12 levels, TSH, CBC, CMP, Lyme screening, blood and urine heavy metals, GM1 antibodies, paraneoplastic antibodies, copper levels, serum protein immunofixation, FRIDA, which were all negative or normal.  Plasma neurofilament light chain levels were elevated, which is typically seen in a number of neurodegenerative disorders like ALS.      /84 (BP Location: Right arm, Patient Position: Sitting, Cuff Size: Adult Regular)   Pulse (!) 121   Resp 19   Ht 1.71 m (5' 7.32\")   Wt 77.6 kg (171 lb 1.6 oz)   SpO2 100%  "  BMI 26.54 kg/m          Latest Ref Rng & Units 5/2/2024     9:34 AM   PFT   FVC L 5.16  P   FEV1 L 4.31  P   FVC% % 117  P   FEV1% % 119  P      P Preliminary result     Neuro exam was not repeated.     In summary, I explained to Mr. Larios that unfortunately the diagnosis is almost certainly limb onset ALS.  He has upper and lower motor neuron signs in 2 regions, evidence of EMG abnormalities indicating denervation in 3 regions, no sensory abnormalities, and no better explanation of his symptoms following extensive imaging and lab studies.    I told him that he is welcome to seek a second neurological opinion to confirm the diagnosis, either in [place] ([facility], [facility], etc) or [place] ([place]).     We discussed the natural history, diagnosis and prognosis of ALS at some length.  I offered him medications that can prolong survival in ALS, including riluzole and Radicava ORS.  He is interested in starting them. He will need AST and ALT monitored monthly during the first 3 months of treatment.    We discussed the possibility of enrollment in research studies.  Given his young age of ALS, it is absolutely appropriate to offer him genetic testing, and I will suggest that he meets with our genetic counselor.  He should see our physical and occupational therapist, and they will advise him on optimal exercises, and whether he needs any gait assistance equipment such as AFOs.  He has moderate right foot dorsiflexion weakness, and an AFO is likely appropriate in this context.    Mr Larios has had worsening anxiety disorder in the last months, and a new ALS diagnosis certainly will not make it easier.  I will offer him escitalopram 10 mg daily, and the dose can be increased to 20 mg after a month if the symptoms do not improve.  I warned him of common side effects of SSRI during treatment initiation.  It would also be appropriate for him to seek mental health counseling locally.    I will see him in follow-up  in 3 months, sooner if needed.  His pulmonary function tests are excellent and he has no bulbar, respiratory symptoms, or nutritional needs.  I emphasized the importance of high-calorie diet in ALS and maintenance of weight.    Total time spent on this encounter today 35 minutes of which 20 face-to-face, 10 in postvisit note dictation, editing, and orders, and 5 in previsit chart review.    The longitudinal plan of care for the diagnosis(es)/condition(s) as documented were addressed during this visit. Due to the added complexity in care, I will continue to support Ilia in the subsequent management and with ongoing continuity of care: ALS              Again, thank you for allowing me to participate in the care of your patient.      Sincerely,    Ananda Swan MD

## 2024-05-02 NOTE — NURSING NOTE
Chief Complaint   Patient presents with    New Patient    Als       Vitals were taken and medications were reconciled.    Asim Macias, Technician  10:29 AM  May 2, 2024

## 2024-05-03 DIAGNOSIS — G12.21 ALS (AMYOTROPHIC LATERAL SCLEROSIS) (H): Primary | ICD-10-CM

## 2024-05-03 LAB
EXPTIME-PRE: 4.69 SEC
FEF2575-%PRED-PRE: 124 %
FEF2575-PRE: 4.55 L/SEC
FEF2575-PRED: 3.67 L/SEC
FEFMAX-%PRED-PRE: 121 %
FEFMAX-PRE: 11.61 L/SEC
FEFMAX-PRED: 9.53 L/SEC
FEV1-%PRED-PRE: 119 %
FEV1-PRE: 4.31 L
FEV1FEV6-PRE: 84 %
FEV1FEV6-PRED: 82 %
FEV1FVC-PRE: 84 %
FEV1FVC-PRED: 82 %
FIFMAX-PRE: 6.68 L/SEC
FVC-%PRED-PRE: 117 %
FVC-PRE: 5.16 L
FVC-PRED: 4.37 L
MEP-PRE: 134 CMH2O
MIP-PRE: -117 CMH2O

## 2024-05-03 NOTE — PROGRESS NOTES
Ilia Larios was seen for a genetic counseling appointment at the request of Dr. Swan today given his diagnosis of ALS.    Pertinent Medical History: Ilia is a 38 year old male with a history of ALS. See Dr. Swan's note for additional details.     Family History: A three generation pedigree was obtained today and scanned into the EMR. This family history is by patient report only and has not been verified with medical records except where noted. The following information is significant:   Ilia has two healthy daughters (ages 6 and 8).  Ilia has one sister (age 36) who is alive and well. She has two sons (age 13 and 11) who are alive and well.  Ilia's father (age 62) is alive and well. Ilia has two paternal uncles who are in their 60s and are healthy. Ilia's paternal grandfather  in his 70s-80s due to heart disease and spinal cancer. Ilia's paternal grandmother  in her 70s or 80s and had a history of mental health concerns. Paternal ancestry is [place].  Ilia's mother (age 70) has COPD and emphysema and a history of smoking. Ilia has two maternal uncles and one maternal aunt who are in their 60s and are alive and well. Ilia has one maternal aunt (age 57) who is alive and well. Ilia's maternal cousins have no history of neurologic concerns. Ilia's maternal grandfather  in his early 70s due to COPD and had a history of smoking. Ilia's maternal grandmother is in her 80s and is alive and well. Maternal ancestry is [place] and [place].   Family history is otherwise negative for ALS, dementia, Parkinson's disease and mental health concerns. Consanguinity was denied.     Discussion: Amyotrophic lateral sclerosis (ALS) is a condition that affects the motor neurons.  Motor neurons are responsible for sending the necessary signals to the muscles, to allow for movement and speech. In ALS, these motor neurons break down and eventually lead to loss of speech and  "paralysis. Some individuals with ALS may also experience neuropsychological symptoms such as cognitive and/or behavioral dysfunction or frontotemporal dementia (FTD) in severe cases. The progression of this condition is approximately three to five years, although it can vary with age of onset and between and within families. Historically, ALS has been categorized into \"familial ALS\" when an individual has two or more close relatives with ALS and \"sporadic ALS\" when an individual with ALS has no family history of the condition. ALS is not generally thought of as a genetic or inherited condition; however recently several genetic factors have been found to be either causative or contributory. When a family history of ALS is identified then we have a strong suspicion that there is a causative genetic factor or genetic change (mutation) that predisposes members of the family to ALS. However, as more research and genetic testing has become available, individuals with seemingly sporadic ALS can have a gene mutation identified. This may be due to a new (de sharlene) mutation in that individual or reduced penetrance. Therefore, a negative family history cannot rule out a possible genetic form of ALS. About 10-15% of individuals with ALS are thought to have \"genetic ALS\" meaning a causative gene mutation has been identified.     There are several genes that have been found to be associated with ALS and/or FTD. The most common genetic cause of ALS is a hexanucleotide repeat expansion (GGGGCC) in the P6jtm14 gene. The number of hexanucleotide repeats in the S6akp62 gene ranges from 2 to >4000. The precise cutoff of between normal and pathogenic (disease causing) is complicated by multiple factors, but generally <25 is considered normal and >60 is considered pathogenic. Repeat expansions in the M0yig96 gene account for approximately 39-45% of familial cases of ALS and about 3-7% of sporadic ALS. Other genes known to cause ALS " include: SOD1, FUS, TARDBP, ANG, OPTN, FIG4, VAPB, UBQLN2, SETX, NEK1, VCP, ALS2, TDP43 (CHCHD10, DCTN1, MATR3, PFN1, TUBA4A, UNC13A, ATXN2).    Genetic forms of ALS are typically inherited in an autosomal dominant pattern, meaning a change on one copy of the gene is sufficient to predispose an individual to the condition. Someone who possesses an ALS gene mutation would have a 1 in 2 (50%) chance of passing the gene mutation associated with ALS on to their children. It is import to know that not all people who inherit an ALS gene mutation will develop ALS. This is called reduced penetrance. For example, the penetrance or percent of individuals who develop ALS who have a mutation in SOD1 ranges from 50% to 90% by age 70, depending on the specific genetic mutation. It is assumed that other genes will also show reduced penetrance. We discussed that if the gene associated with ALS is NOT passed on; that child is NOT at an increased risk to develop symptoms because of this genetic change and CANNOT pass it on to their children. The only way to know if the genetic change is passed on is by genetic testing.    We discussed the availability of genetic testing for ALS. The gold standard of ALS genetic testing is to begin testing the A4cho99 gene to determine if an individual has a normal number of hexanucleotide repeats (<25) or an expanded number of hexanucleotide repeats (>60). If that testing identifies normal repeats on both copies of K5xop10, then testing can be expanded to a multi-gene panel to include the other genes associated with ALS. We went on to discuss the details, limitations, and possible outcomes of these multi-gene panels. In particular, we discussed that there are three possible results:  Negative: meaning normal or no mutations are identified in the genes that were tested/sequenced  Positive: meaning a mutation that is known to be associated with a particular set of symptoms is identified  Variant of  uncertain significance (VUS): meaning a change in the DNA sequence of a particular gene was seen but there is not enough information or data yet to know if it explains the symptoms. If a VUS is identified, testing of other relatives may be helpful to provide clarification.  In most cases, identification of a VUS does not confirm a diagnosis and does not result in any clinically actionable recommendations.    Even with the growing numbers of genes that have been identified, current clinically available genetic testing identifies a causative mutation in less than 40% families affected with familial ALS. Therefore, most of the time we cannot identify the genetic cause in individuals with ALS. If the results of genetic testing are negative, this cannot rule out the possibility that there may be an underlying genetic cause or component to an individual's ALS, as it is likely that the genetic cause of all forms of ALS have yet to be discovered. DNA banking is the process in which we can store an individual's DNA almost indefinitely to test at a later time when new genetic tests are available. Additionally, the DNA could be used in research with the participant's consent. This can be done for any genetic condition. It can also be done for conditions which are not thought to be genetic or where there isn't a known family history. Saving the sample may allow for testing as advances are made in diagnosis and treatment.  DNA banking is available through many laboratories.    We discussed the option for ALS gene panel testing via a sponsored or sponsored option. Risks benefits and limitations of these options were reviewed. Ilia expressed an excellent understanding of this information and consented to a sponsored ALS panel.    Plan:  1. Sponsored ALS panel with [id number] and ATXN2 repeat analysis at [facility]  2. Return pending results of above testing  3. Contact information was provided should any questions arise  in the future.     Danielle Colvin MS [facility]  Genetic Counselor  Division of Genetics and Metabolism  (p) [phone]  (f) [phone]     Total time spent in consultation with the family was approximately 20 minutes      Cc: No Letter

## 2024-05-28 DIAGNOSIS — G12.21 ALS (AMYOTROPHIC LATERAL SCLEROSIS) (H): ICD-10-CM

## 2024-05-28 RX ORDER — RILUZOLE 50 MG/1
50 TABLET, FILM COATED ORAL EVERY 12 HOURS
Qty: 180 TABLET | Refills: 1 | Status: SHIPPED | OUTPATIENT
Start: 2024-05-28 | End: 2024-09-03

## 2024-06-04 ENCOUNTER — TELEPHONE (OUTPATIENT)
Dept: NEUROLOGY | Facility: CLINIC | Age: 39
End: 2024-06-04

## 2024-06-04 NOTE — TELEPHONE ENCOUNTER
Contacted Scar to review the results of his ALS gene panel.    Our genes are sequences of letters that provide instructions that help our body grow, develop and function. Sometimes a change occurs in one of our genes that causes the body to be unable to read these instructions. This results in a genetic condition.    ALS genetic testing consists of two parts. The first is testing to determine the number of repeats in genes called [id number] and ATXN2 and the second is sequencing and deletion/duplication analysis of a panel of genes related to ALS to determine if changes are present.     Ilia's genetic testing looked at a gene called [id number] to determine if their is an expanded number of repeats present. This testing was negative or normal. This test identified 2 and 8 repeats (less that 24 is considered normal).     Ilia's genetic testing looked at a gene called ATXN2 to determine if their is an expanded number of repeats present. This testing was negative or normal. This test identified 22 and 22 repeats (less that 31 is considered normal).     Ilia's ALS panel looked for changes in multiple genes related to ALS. This testing was also negative or normal.     Negative genetic testing does not rule out or change a clinical diagnosis of ALS. It is likely that there are single gene causes of ALS that have not been identified and are not tested for with today's technology.     Danielle Colvin MS [facility]  Genetic Counselor  Division of Genetics and Metabolism  (p) [phone]  (f) [phone]

## 2024-08-20 ENCOUNTER — PATIENT OUTREACH (OUTPATIENT)
Dept: NEUROLOGY | Facility: CLINIC | Age: 39
End: 2024-08-20
Payer: COMMERCIAL

## 2024-08-20 DIAGNOSIS — G12.21 ALS (AMYOTROPHIC LATERAL SCLEROSIS) (H): Primary | ICD-10-CM

## 2024-08-20 NOTE — PROGRESS NOTES
ALS Care Coordination - Outreach Note    REASON FOR CONTACT:   Clinic Care Coordination - Follow-up (pre-visit)       ASSESSMENT:       8/16/2024   Since last visit   Key event: Hospitalized, skin breakdown, fall? None   Principle concerns: Any new? No   Respiratory, sleep, energy symptoms: Any new? Daytime fatigue   Bulbar symptoms: Any new or worse? No symptoms   Communication: Any new issues? No symptoms   Gastrostomy: Any concerns? Not applicable, do not have a gastrostomy   Gross motor and mobility: Any new concerns? No concerns   Pain/discomfort: Any new symptoms? No pain   Medication: Taking riluzole, Radicava, or Relyvrio? Riluzole   Medication: Have had to stop riluzole, Radicava, or Relyvrio? No, none of these   Medication: Taking Mucinex or Robinul? No   Home care: Currently receiving services? No   Home care: Equipment used at home? No   Home care: Medical equipment company? None         PLAN:   - Patient to follow up in clinic as scheduled.      Cara Larsen LPN  ALS Neurology Care Coordinator  [facility]

## 2024-08-22 ENCOUNTER — TELEPHONE (OUTPATIENT)
Dept: NEUROLOGY | Facility: CLINIC | Age: 39
End: 2024-08-22

## 2024-08-22 ENCOUNTER — OFFICE VISIT (OUTPATIENT)
Dept: NEUROLOGY | Facility: CLINIC | Age: 39
End: 2024-08-22
Payer: COMMERCIAL

## 2024-08-22 ENCOUNTER — ALLIED HEALTH/NURSE VISIT (OUTPATIENT)
Dept: NEUROLOGY | Facility: CLINIC | Age: 39
End: 2024-08-22

## 2024-08-22 ENCOUNTER — APPOINTMENT (OUTPATIENT)
Dept: OCCUPATIONAL THERAPY | Facility: CLINIC | Age: 39
End: 2024-08-22
Payer: COMMERCIAL

## 2024-08-22 ENCOUNTER — THERAPY VISIT (OUTPATIENT)
Dept: PHYSICAL THERAPY | Facility: CLINIC | Age: 39
End: 2024-08-22
Payer: COMMERCIAL

## 2024-08-22 ENCOUNTER — OFFICE VISIT (OUTPATIENT)
Dept: PULMONOLOGY | Facility: CLINIC | Age: 39
End: 2024-08-22
Payer: COMMERCIAL

## 2024-08-22 VITALS
OXYGEN SATURATION: 98 % | DIASTOLIC BLOOD PRESSURE: 89 MMHG | WEIGHT: 178 LBS | BODY MASS INDEX: 27.61 KG/M2 | SYSTOLIC BLOOD PRESSURE: 134 MMHG | HEART RATE: 89 BPM

## 2024-08-22 DIAGNOSIS — G12.21 ALS (AMYOTROPHIC LATERAL SCLEROSIS) (H): Primary | ICD-10-CM

## 2024-08-22 DIAGNOSIS — G12.21 ALS (AMYOTROPHIC LATERAL SCLEROSIS) (H): ICD-10-CM

## 2024-08-22 DIAGNOSIS — Z53.9 ERRONEOUS ENCOUNTER--DISREGARD: Primary | ICD-10-CM

## 2024-08-22 PROCEDURE — 94799 UNLISTED PULMONARY SVC/PX: CPT | Performed by: INTERNAL MEDICINE

## 2024-08-22 PROCEDURE — 99214 OFFICE O/P EST MOD 30 MIN: CPT | Performed by: PSYCHIATRY & NEUROLOGY

## 2024-08-22 PROCEDURE — 97535 SELF CARE MNGMENT TRAINING: CPT | Mod: GP | Performed by: PHYSICAL THERAPIST

## 2024-08-22 PROCEDURE — 94375 RESPIRATORY FLOW VOLUME LOOP: CPT | Performed by: INTERNAL MEDICINE

## 2024-08-22 PROCEDURE — 97162 PT EVAL MOD COMPLEX 30 MIN: CPT | Mod: GP | Performed by: PHYSICAL THERAPIST

## 2024-08-22 ASSESSMENT — REVISED AMYOTROPHIC LATERAL SCLEROSIS FUNCTIONAL RATING SCALE (ALSFRS-R)
SPEECH: NORMAL SPEECH PROCESSES
ORTHOPENA: 4
SALIVATION: 4
SIX_ITEM_SUBTOTAL: 18
ALSFRS_TOTAL_SCORE: 37
DRESSING_AND_HYGEINE: 3
RESPIRATORY_SUBTOTAL_SCORE: 12
HANDWRITING: ABLE TO GRIP PEN BUT UNABLE TO WRITE
SPEECH: 4
BULBAR_SUBTOTAL: 12
DRESSING_AND_HYGEINE: INDEPENDENT AND COMPLETE SELF-CARE WITH EFFORT OR DECREASED EFFICIENCY
HANDWRITING: 1
WALKING: 3
CLIMBING_STAIRS: 2
SWALLOWING: 4
RESPIRATORY_INSUFFICIENCY: 4
TURNING_IN_BED_AND_ADJUSTING_BED_CLOTHES: 3
CUTTING_FOOD_AND_HANDLING_UTENSILES: 1
CLIMBING_STAIRS: MILD UNSTEADINESS OR FATIGUE
SALIVATION: NORMAL
DYSPNEA: 4
TURNING_IN_BED_AND_ADJUSTING_BED_CLOTHES: SOMEWHAT SLOW AND CLUMSY, BUT NO HELP NEEDED
SWALLOWING: NORMAL EATING HABITS
WALKING: EARLY AMBULATION DIFFICULTIES
FINE_MOTOR_SUBTOTAL_SCORE: 5
GROSS_MOTOR_SUBTOTAL_SCORE: 8

## 2024-08-22 ASSESSMENT — PAIN SCALES - GENERAL: PAINLEVEL: NO PAIN (0)

## 2024-08-22 NOTE — LETTER
8/22/2024       RE: Ilia Larios  [address]  [place] [address]     Dear Colleague,    Thank you for referring your patient, Ilia Larios, to the [facility] [place] at [facility]. Please see a copy of my visit note below.    King Sullivan MD (PCP)  [place], August 22, 2024    Dear Dr. Sullivan,    I had the pleasure to see Ilia in follow-up at the [facility] today for his limb onset ALS.  Details of the diagnosis are summarized my previous note.  He began developing left hand weakness and clumsiness in January to February 2024, followed by right foot weakness and drop.  Neurological examination showed definite upper and lower motor neuron signs in 3 regions and EMG was consistent with diagnosis.  Alternatives were thoroughly excluded by extensive MRI imaging and lab test.  Genetic testing for familial ALS genes was negative.    Compared to 4 months ago, Ilia has no some progression of his left hand weakness, which is the dominant hand.  This gives him some difficulties with handwriting and holding a pen.  He also has some difficulties cutting his food.  He brushes his teeth and generally cuts his food with the right hand.  The right hand is still functioning well.  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  Since the last visit he got a right AFO that he uses when he ambulates outside the house.  She has found it very useful.  He denies recent falls.  He gets occasional cramping at the hip and gluteal region at night, which can he can overcome with stretching.  Otherwise no cramps or pain.  He has no bulbar or respiratory symptoms/concerns.    Current Outpatient Medications   Medication Sig Dispense Refill     cetirizine (ZYRTEC) 10 MG tablet Take 10 mg by mouth as needed for allergies       escitalopram (LEXAPRO) 10 MG  tablet Take 1 tablet daily in the morning. Can increase to 2 tablets daily after 1 month if no improvement in anxiety sx 90 tablet 1     ibuprofen (ADVIL/MOTRIN) 400 MG tablet Take 400 mg by mouth every 6 hours as needed for moderate pain       Psyllium-Calcium (METAMUCIL PLUS CALCIUM) CAPS        riluzole (RILUTEK) 50 MG tablet Take 1 tablet (50 mg) by mouth every 12 hours 180 tablet 1     No current facility-administered medications for this visit.       /89   Pulse 89   Wt 80.7 kg (178 lb)   SpO2 98%   BMI 27.61 kg/m          Latest Ref Rng & Units 8/22/2024     8:45 AM   PFT   FVC L 4.71  P   FEV1 L 3.77  P   FVC% % 107  P   FEV1% % 104  P      P Preliminary result      EXAM was not repeated.    In summary, Ilia has limb onset ALS with negative genetic testing.  We discussed a number of practical issues.  He will see our physical therapist today.  He will continue using his right AFO.  Occupational Therapy was not available in clinic today.  I will ask them to contact the patient next week and discussed the possibility of prescribing special equipment to facilitate cutting his food (rocker bottom knife, etc) or writing given increasing left hand weakness.  There are no bulbar or respiratory symptoms/concerns.  He would like to talk to our research coordinator about current and upcoming studies.      He will continue riluzole at 50 mg twice daily.  Will check AST and ALT again today.  AST and ALT were checked in May and June 2024, and were within normal limits.  If everything is normal, he would like to add Radicava ORS. Our pharmacist will facilitate the pre-authorization process for this drug.     He will return to clinic for follow-up in 4 months, sooner if needed.    Sincerely,      Ananda Swan MD, FAAN    The longitudinal plan of care for the diagnosis(es)/condition(s) as documented were addressed during this visit. Due to the added complexity in care, I will continue to support Ilia  in the subsequent management and with ongoing continuity of care.    Billing MDM level 4 (moderate) based on 1) Problems assessed: One chronic disorder with progression, exacerbation, or side effects of treatment (ALS) and 2)Risk: prescription drug management, see above.      Again, thank you for allowing me to participate in the care of your patient.      Sincerely,    Ananda Swan MD

## 2024-08-22 NOTE — NURSING NOTE
Registry/Observational Study     Patient was offered participation in the Multi-Site Natural History of ALS and other motor neuron disorders registry. The consent and HIPAA form was discussed and   and explained to the patient. It was discussed that involvement with the study is voluntary and refusal to participate would not involve penalty or decrease benefits at which the patient is entitled, and the subject may discontinue his/her involvement at any time without penalty or loss in benefits. The patient was given time to review and ask any questions about the consent. Patient verbalized understanding of the study. A copy of the signed consent was given to the patient for their records.     Subject Informed Consent and Authorization Form: SIGNED ON 8/22/2024    Multi-Site Natural History of ALS and other motor neuron disorders  PI: Ras Godwin MD  : Pau Reyes [email or web address]

## 2024-08-22 NOTE — PROGRESS NOTES
Ilia Larios comes into clinic today at the request of Dr. Swan Ordering Provider for PFT      Jose Manuel Fu , RRT

## 2024-08-22 NOTE — TELEPHONE ENCOUNTER
Will be starting Radicava. Patient completed [employer] paperwork in clinic today. Prior authorization will need to be started.     Placing MTM order per verbal request from Dr. Swan.    Lia Chahal, PharmD, [facility]  Medication Therapy Management Pharmacist   [facility]

## 2024-08-22 NOTE — PROGRESS NOTES
King Sullivan MD (PCP)  [place], August 22, 2024    Dear Dr. Sullivan,    I had the pleasure to see Ilia in follow-up at the [facility] certified [facility] disease [facility] today for his limb onset ALS.  Details of the diagnosis are summarized my previous note.  He began developing left hand weakness and clumsiness in January to February 2024, followed by right foot weakness and drop.  Neurological examination showed definite upper and lower motor neuron signs in 3 regions and EMG was consistent with diagnosis.  Alternatives were thoroughly excluded by extensive MRI imaging and lab test.  Genetic testing for familial ALS genes was negative.    Compared to 4 months ago, Ilia has no some progression of his left hand weakness, which is the dominant hand.  This gives him some difficulties with handwriting and holding a pen.  He also has some difficulties cutting his food.  He brushes his teeth and generally cuts his food with the right hand.  The right hand is still functioning well.  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  Since the last visit he got a right AFO that he uses when he ambulates outside the house.  She has found it very useful.  He denies recent falls.  He gets occasional cramping at the hip and gluteal region at night, which can he can overcome with stretching.  Otherwise no cramps or pain.  He has no bulbar or respiratory symptoms/concerns.    Current Outpatient Medications   Medication Sig Dispense Refill    cetirizine (ZYRTEC) 10 MG tablet Take 10 mg by mouth as needed for allergies      escitalopram (LEXAPRO) 10 MG tablet Take 1 tablet daily in the morning. Can increase to 2 tablets daily after 1 month if no improvement in anxiety sx 90 tablet 1    ibuprofen (ADVIL/MOTRIN) 400 MG tablet Take 400 mg by mouth every 6 hours as needed for moderate pain      Psyllium-Calcium (METAMUCIL PLUS CALCIUM) CAPS       riluzole (RILUTEK)  50 MG tablet Take 1 tablet (50 mg) by mouth every 12 hours 180 tablet 1     No current facility-administered medications for this visit.       /89   Pulse 89   Wt 80.7 kg (178 lb)   SpO2 98%   BMI 27.61 kg/m          Latest Ref Rng & Units 8/22/2024     8:45 AM   PFT   FVC L 4.71  P   FEV1 L 3.77  P   FVC% % 107  P   FEV1% % 104  P      P Preliminary result      EXAM was not repeated.    In summary, Ilia has limb onset ALS with negative genetic testing.  We discussed a number of practical issues.  He will see our physical therapist today.  He will continue using his right AFO.  Occupational Therapy was not available in clinic today.  I will ask them to contact the patient next week and discussed the possibility of prescribing special equipment to facilitate cutting his food (rocker bottom knife, etc) or writing given increasing left hand weakness.  There are no bulbar or respiratory symptoms/concerns.  He would like to talk to our research coordinator about current and upcoming studies.      He will continue riluzole at 50 mg twice daily.  Will check AST and ALT again today.  AST and ALT were checked in May and June 2024, and were within normal limits.  If everything is normal, he would like to add Radicava ORS. Our pharmacist will facilitate the pre-authorization process for this drug.     He will return to clinic for follow-up in 4 months, sooner if needed.    Sincerely,      Ananda Swan MD, FAAN    The longitudinal plan of care for the diagnosis(es)/condition(s) as documented were addressed during this visit. Due to the added complexity in care, I will continue to support Ilia in the subsequent management and with ongoing continuity of care.    Billing MDM level 4 (moderate) based on 1) Problems assessed: One chronic disorder with progression, exacerbation, or side effects of treatment (ALS) and 2)Risk: prescription drug management, see above.

## 2024-08-22 NOTE — PATIENT INSTRUCTIONS
Please call Cara @ [phone] for questions or concerns during regular business hours. For a more efficient way to communicate, use [employer] and address the message to your physician. Remember, MyChart is only read during business hours. Do not leave urgent messages on voicemail or [employer]. If situation is urgent, contact the Neurology Clinic @ [phone] and ask to speak to a Triage Nurse or Call 911 or visit an Emergency Department.    Please call your pharmacy if you need a medication refill. They will send us an electronic message.

## 2024-08-22 NOTE — PROGRESS NOTES
PHYSICAL THERAPY EVALUATION  Type of Visit: Evaluation    See electronic medical record for Abuse and Falls Screening details.    Subjective       Presenting condition or subjective complaint:    Patient was seen today in ALS clinic. Symptoms first started in January/February 2024 with onset of weakness and loss of dexterity in the left hand, fasciculations, and right LE weakness. He reports that he has been walking with his AFO and it is really helping. Legs get tired but he is able to do most of what he was doing previously. He has been golfing, walking a lot, and is stretching a lot. He is losing strength in his left (dominant hand) which makes anything requiring gripping difficult.  Date of onset:      Relevant medical history:     Dates & types of surgery:      Prior diagnostic imaging/testing results:       Prior therapy history for the same diagnosis, illness or injury:        Prior Level of Function  Transfers: Independent  Ambulation: Independent  ADL: Independent  IADL: Childcare, Driving, Finances, Meal preparation, Medication management, Work    Living Environment  Social support:   Spouse and kids  Type of home:    2 story home. Bedroom and bathroom upstairs but pt can stay in a small office and half bath if condition progresses.   Stairs to enter the home:         Ramp:     Stairs inside the home:         Help at home:   none  Equipment owned:   R AFO,     Employment:     Realtor  Hobbies/Interests:   Staying active, playing golf, alternates between strength training/cardio, being with family    Patient goals for therapy:   nothing specific    Pain assessment:  Pain denied     Objective    NEURO CLINIC EVALUATION    Others present at visit: None    Cardio-respiratory status: Forced vital capacity: 107% of predicted     Height/Weight: 5'7/ 178 lbs    Technology used: computer, smart phone    ALS FRS:  37/48    Evaluation   Interview completed.     Range of motion:  WNL; calf tightness  Telephone Encounter by Marina Bose at 01/18/18 01:45 PM     Author:  Marina Bose Service:  (none) Author Type:  Patient      Filed:  01/18/18 01:47 PM Encounter Date:  1/18/2018 Status:  Signed     :  Marina Bose (Patient )              BRIAS FINE    Patient Age: 7 year old   Refill request by:[MM1.1T] Phone.  Patient wants a call back? Yes -  Ok to leave response (including medical information) with family member or on answering machine[MM1.1M]  Refill to be:[MM1.1T] ePrescribed to[MM1.1M]   Pharmacy     No Pharmacy[MM1.2T] [facility][MM1.1M]           Medication requested to be refilled:[MM1.1T]   Requested Prescriptions     Pending Prescriptions Disp Refills   • EPINEPHrine (EPIPEN JR 2-KIRAN) 0.15 MG/0.3ML SOAJ 2 Each 0     Sig: Inject 1 Device as directed as needed.[MM1.2T]           Next and Last Visit with Provider and Department  Next visit with KALYAN SOLIS is on No match found  Next visit with PEDIATRICS is on No match found   Last visit with KALYAN SOLIS was on 04/14/2017 at  2:30 PM in PEDIATRICS [facility]  Last visit with PEDIATRICS was on 05/31/2017 at  4:45 PM in PEDIATRICS       WEIGHT AND HEIGHT: As of 08/10/2017 weight is 52.6 lbs.(23.859 kg).   BMI is 15.72 kg/(m^2) calculated from:     Height 3' 11.75\" (1.213 m) as of 4/14/17     Weight 51 lb (23.133 kg) as of 4/14/17[MM1.1T]      Allergies     Allergen  Reactions   • Bee Rash[MM1.2T]     Current outpatient prescriptions       Medication  Sig Dispense Refill   • hydrocortisone 1 % cream Apply  topically as needed.     • hydrocortisone 2.5 % cream apply over affected area twice a day 30 g 1   • EPINEPHrine (EPIPEN JR 2-KIRAN) 0.15 MG/0.3ML SOAJ Inject 1 Device as directed as needed. 2 Each 0        ROUTING:[MM1.1T] Patient's physician/staff[MM1.1M]        PCP: Kalyan Solis MD         INS: Payor: [employer] / Plan: *No Plan* / Product Type: *No Product type* /  Note: This is the primary coverage, but no account was found for this location or the patient's primary location.   ADDRESS:  [facility] [facility] [address][MM1.1T]         Revision History        User Key Date/Time User Provider Type Action    > MM1.2 01/18/18 01:47 PM Marina Bose Patient  Sign     MM1.1 01/18/18 01:45 PM Marina Bose Patient      M - Manual, T - Template             bilaterally      Manual muscle testing:   R ankle DF 4-/5, R hip flexion 4+/5, all other MMT in LEs is 5/5. R UE 5/5. Left shoulder flex 5/5, shoulder abd 4/5, L elbow flex/ext 4/5, L wrist ext 2/5, L wrist flex 3+/5     5 Time Sit to Stand:  10.0 sec     10 Meter Walk:  6.23 sec     Romber sec EO/EC; Sharpened Romberg EO 30 sec, EC 20 sec.     Gait: Patient ambulates independently with R AFO. Pelvic instability, tendency for knee flexion in stance phase. Gait speed .96 m/sec. No LOB.      Cognition:  Intact    Recommended Interventions:  ADL/Self Care/Home Management    Treatment provided this date:   ADL/Self Care/Home Management: 15 minutes   -Educated patient on energy conservation techniques including pacing of activities, frequent rest breaks, use of assistive devices and monitoring signs of fatigue (increased muscle soreness, weakness, cramps, fasciculations) for safe functional mobility and performance of daily tasks  -Educated patient on exercise recommendations including focus on stretching and light cardiovascular conditioning as main modes. Educated on safe strengthening principles including endurance focus for muscle groups that have anti gravity strength. Educated patient to monitor signs for red flags after exercise (increased weakness, cramping, fasciculations) that last >30 minutes.  -Educated patient on fall risk reduction techniques including energy conservation techniques, monitoring signs of fatigue, continued use of AFO for walking, use of handrail on all stairs, and limiting/not carrying anything up/down the stairs.    -To improve L wrist/hand positioning for overall improvement of function and to preserve ROM, educated on the benefits of a wrist splint and recommended that OT referral be placed for fitting of a splint. Patient in agreement with this. OT order requested.  -Educated on different pieces of adaptive equipment to improve ability to use his left (dominant) hand, including  built-up utensils, rocker knife, and red foam for writing utensils. Patient demonstrated ability to utilize tools correctly and reported them mildly helpful. Did request from [employer].         Response to treatment/recommendations: Patient was receptive and verbalized understanding of all recommendations.    Goal attainment:  All goals met    Total Evaluation Time (Minutes): 15   Timed Code Treatment Minutes: 15   Total Treatment Time (sum of timed and untimed services): 30    Assessment & Plan   CLINICAL IMPRESSIONS  Medical Diagnosis: ALS    Treatment Diagnosis: Force Production Deficit   Impression/Assessment: Patient is a 38 year old male with complaints of muscle weakness and increased fatigue.  The following significant findings have been identified: Decreased strength, Impaired gait, Impaired muscle performance, Decreased activity tolerance, and Instability. These impairments interfere with their ability to perform self care tasks, work tasks, recreational activities, household chores, and community mobility as compared to previous level of function.     Clinical Decision Making (Complexity):  Clinical Presentation: Evolving/Changing  Clinical Presentation Rationale: based on medical and personal factors listed in PT evaluation  Clinical Decision Making (Complexity): Moderate complexity    PLAN OF CARE  Treatment Interventions:  Interventions: Gait Training, Neuromuscular Re-education, Therapeutic Activity, Therapeutic Exercise, Self-Care/Home Management    Long Term Goals     PT Goal 1  Goal Identifier: Understand evaluation  Goal Description: Patient, family and/or caregiver will verbalize understanding of evaluation results and implications for functional performance.  Target Date: 08/22/24  Date Met: 08/22/24  PT Goal 2  Goal Identifier: Compensatory methods/equipment  Goal Description: Patient, family and/or caregiver will verbalize/demonstrate understanding of compensatory methods/equipment to enhance  functional independence and safety  Target Date: 08/22/24  Date Met: 08/22/24  PT Goal 3  Goal Identifier: Positioning/equipment  Goal Description: Patient, family and/or caregiver will verbalize/demonstrate understanding of positioning techniques/equipment  Target Date: 08/22/24  Date Met: 08/22/24  PT Goal 4  Goal Identifier: HEP  Goal Description: Patient, family and/or caregiver will verbalize/demonstrate understanding of home program  Target Date: 08/22/24  Date Met: 08/22/24  PT Goal 5  Goal Identifier: Energy Management  Goal Description: Patient, family and/or caregiver will verbalize energy management techniques appropriate for satus and setting  Target Date: 08/22/24  Date Met: 08/22/24      Frequency of Treatment: 1 time  Duration of Treatment: 1 day    Recommended Referrals to Other Professionals:  OT for L wrist/hand splint  Education Assessment:   Learner/Method: Patient;Caregiver;Listening;Pictures/Video    Risks and benefits of evaluation/treatment have been explained.   Patient/Family/caregiver agrees with Plan of Care.     Evaluation Time:     PT Eval, Moderate Complexity Minutes (81262): 15       Signing Clinician: Monica Santos, PT

## 2024-08-25 LAB
EXPTIME-PRE: 6.81 SEC
FEF2575-%PRED-PRE: 97 %
FEF2575-PRE: 3.58 L/SEC
FEF2575-PRED: 3.66 L/SEC
FEFMAX-%PRED-PRE: 110 %
FEFMAX-PRE: 10.52 L/SEC
FEFMAX-PRED: 9.53 L/SEC
FEV1-%PRED-PRE: 104 %
FEV1-PRE: 3.77 L
FEV1FEV6-PRE: 81 %
FEV1FEV6-PRED: 82 %
FEV1FVC-PRE: 80 %
FEV1FVC-PRED: 82 %
FIFMAX-PRE: 4.62 L/SEC
FVC-%PRED-PRE: 107 %
FVC-PRE: 4.71 L
FVC-PRED: 4.37 L
MEP-PRE: 113 CMH2O
MIP-PRE: -112 CMH2O

## 2024-08-26 ENCOUNTER — TELEPHONE (OUTPATIENT)
Dept: NEUROLOGY | Facility: CLINIC | Age: 39
End: 2024-08-26
Payer: COMMERCIAL

## 2024-08-26 ENCOUNTER — VIRTUAL VISIT (OUTPATIENT)
Dept: NEUROLOGY | Facility: CLINIC | Age: 39
End: 2024-08-26
Attending: PSYCHIATRY & NEUROLOGY
Payer: COMMERCIAL

## 2024-08-26 DIAGNOSIS — J30.2 SEASONAL ALLERGIC RHINITIS: ICD-10-CM

## 2024-08-26 DIAGNOSIS — R52 PAIN: ICD-10-CM

## 2024-08-26 DIAGNOSIS — F41.1 GAD (GENERALIZED ANXIETY DISORDER): ICD-10-CM

## 2024-08-26 DIAGNOSIS — G12.21 ALS (AMYOTROPHIC LATERAL SCLEROSIS) (H): Primary | ICD-10-CM

## 2024-08-26 DIAGNOSIS — K59.00 CONSTIPATION: ICD-10-CM

## 2024-08-26 RX ORDER — EDARAVONE 105 MG/5ML
105 KIT ORAL DAILY
Qty: 50 ML | Refills: 11 | Status: SHIPPED | OUTPATIENT
Start: 2024-08-26

## 2024-08-26 RX ORDER — EDARAVONE 105 MG/5ML
105 KIT ORAL DAILY
Qty: 70 ML | Refills: 0 | Status: SHIPPED | OUTPATIENT
Start: 2024-08-26

## 2024-08-26 NOTE — Clinical Note
8/26/2024       RE: Ilia Larios  [address]  [facility] [address]     Dear Colleague,    Thank you for referring your patient, Ilia Larios, to the [facility] MULTIPLE [facility] CL[facility] [place] at [facility]. Please see a copy of my visit note below.    Medication Therapy Management (MTM) Encounter    ASSESSMENT:                            Medication Adherence/Access: {adherencechoices:013686}    ***:   ***    PLAN:                            ***    Follow-up: {followuptest2:426855}    SUBJECTIVE/OBJECTIVE:                          Ilia Larios is a 39 year old male seen for an initial visit. He was referred to me from Dr. Swan.      Reason for visit: Initial MTM to start Radicava.    Allergies/ADRs: Reviewed in chart  Past Medical History: Reviewed in chart  Tobacco: He reports that he has never smoked. He has never used smokeless tobacco.  Alcohol: {ALCOHOL CONSUMPTION HX:734859}  {Social and Goals:526406}  Medication Adherence/Access: {fumedadherence:700130}    ALS  -Riluzole 50mg twice daily    Started riluzole on May 2024. Tolerating medication. Getting labs done today       Mental Health  Anxiety  Escitalopram 10mg once daily.   Patient reports { :636910}.  {mtmdepassess:904201}       Seasonal Allergies:   -Cetirizine 10mg once daily     Pain  -Ibuprofen 400mg every 6 hours as needed    Today's Vitals: There were no vitals taken for this visit.  ----------------      I spent {mtm total time 3:603027} with this patient today. I offer these suggestions for consideration by Dr. Swan. A copy of the visit note was provided to the patient's provider(s).    A summary of these recommendations was sent via i[employer] Reminder.    Lia Chahal, PharmD, BCACP  Medication Therapy Management Pharmacist   [facility]    Telemedicine Visit Details  Type of service:  Telephone visit  Start Time: {video/phone visit start time:867779}  End Time:  {video/phone visit end time:582838}     Medication Therapy Recommendations  No medication therapy recommendations to display     Medication Therapy Management (MTM) Encounter    ASSESSMENT:                            Medication Adherence/Access: {adherencechoices:256991}    ***:   ***    PLAN:                            ***    Follow-up: {followuptest2:792068}    SUBJECTIVE/OBJECTIVE:                          Ilia Larios is a 39 year old male seen for an initial visit. He was referred to me from Dr. Swan.      Reason for visit: Initial MTM to start Radicava.    Allergies/ADRs: Reviewed in chart  Past Medical History: Reviewed in chart  Tobacco: He reports that he has never smoked. He has never used smokeless tobacco.  Alcohol: {ALCOHOL CONSUMPTION HX:992579}  {Social and Goals:195450}  Medication Adherence/Access: {fumedadherence:274212}    ALS  -Riluzole 50mg twice daily    Started riluzole on May 2024. Tolerating medication. Getting labs done today       Mental Health  Anxiety  Escitalopram 10mg once daily. Has not increased to 2 tablets (20mg) daily. He wasn't sure when he should increase. Reports he was started on this when he was diagnosed with ALS back in May  Patient reports no current medication side effects.  Patient reports symptoms are improved slightly since starting  Patient reports the following stressors: ALS diagnosis       Seasonal Allergies:   -Cetirizine 10mg once daily as needed during allergy season  No reported side effects of medication    Pain  -Ibuprofen 400mg every 6 hours as needed  Patient reports no current medication side effects    Constipation   -Metamucil once daily     Today's Vitals: There were no vitals taken for this visit.  ----------------      I spent 18 minutes with this patient today. I offer these suggestions for consideration by Dr. Swan. A copy of the visit note was provided to the patient's provider(s).    A summary of these recommendations was sent via  Graeme.    Lia Chahal, PharmD, BCACP  Medication Therapy Management Pharmacist   [facility]    Telemedicine Visit Details  Type of service:  Telephone visit  Start Time:  1:00 PM  End Time:  1:18 PM     Medication Therapy Recommendations  No medication therapy recommendations to display       Again, thank you for allowing me to participate in the care of your patient.      Sincerely,    Lia Chahal RPH

## 2024-08-26 NOTE — TELEPHONE ENCOUNTER
Patient was seen today for an MTM visit to start Radicava ORS. Pending starter and maintenance dosing for Dr. Swan to review and sign. Will have prescriptions sent to [facility] unless insurance requires alternative pharmacy.     Lia Chahal, PharmD, [facility]  Medication Therapy Management Pharmacist   [facility]

## 2024-08-26 NOTE — PATIENT INSTRUCTIONS
"Recommendations from today's MTM visit:                                                    Today we reviewed what your medicines are for, how to know if they are working, that your medicines are safe and how to make your medicine regimen as easy as possible.      Start Radicava ORS:  Dosing: Take 5 mL in the morning upon waking after fasting at least 8 hours and at least 1 hour before your first meal of the day. You may take Radicava at the same time as riluzole and escitalopram.   For month 1, you will take Radicava once daily for 14 days, then will NOT take the medication for 14 days (2 orange bottles)  For subsequent months, you will take Radicava once daily for 10 days, then will NOT take the medication for 18 days (blue bottle)  Using a calendar to iam days you have taken medication can be helpful!  Side effects of Radicava can include: bruising, abnormal gait, and headache.   Shake well before each use and the bottles do not need to be stored in the refrigerator  Additional information about how to take Radicava can be found here: [email or web address]  Radicava will be filled at [facility] unless your insurance requires a different pharmacy. They will call you to set up the first order and then they will mail the prescription to your home.    Continue to take riluzole 50mg twice daily  You can try increasing escitalopram to 20mg once daily if you are still experiencing some anxiety.     Follow-up: 11/25/2024 at 1:00 PM via phone call    It was great speaking with you today.  I value your experience and would be very thankful for your time in providing feedback in our clinic survey. In the next few days, you may receive an email or text message from [employer] with a link to a survey related to your  clinical pharmacist.\"     To schedule another MTM appointment, please call the clinic directly or you may call the MTM scheduling line at [phone] or " toll-free at [phone].     My Clinical Pharmacist's contact information:                                                      Please feel free to contact me with any questions or concerns you have.      Lia Chahal, PharmD, [facility]  Medication Therapy Management Pharmacist   [facility]

## 2024-08-26 NOTE — TELEPHONE ENCOUNTER
PA Initiation    Medication: RADICAVA ORS STARTER  MG/5ML PO SUSP  Insurance Company: [employer] ([facility]) - Phone [phone] Fax [phone]  Pharmacy Filling the Rx: [facility] - [place], [place] - [address]  Filling Pharmacy Phone:    Filling Pharmacy Fax:    Start Date: 8/26/2024          Thank you,    Kim Penny [facility]  Specialty Pharmacy Clinic Liaison - CardiologyNeurologyMultiple [address]  [address] [place] [address]  Ph: [phone] Fax: [phone]  [email or web address]

## 2024-08-26 NOTE — PROGRESS NOTES
Medication Therapy Management (MTM) Encounter    ASSESSMENT:                            Medication Adherence/Access: No issues identified    ALS  Patient may benefit from starting Radicava to slow progression of ALS. Educated patient on possible side effects, dosing schedule and how to properly administer the medication.  Radicava has already been approved by insurance and will send prescription to [facility] unless insurance requires alternative pharmacy. Patient is due for labs to check liver enzymes and will be completing today. Last labs were slightly elevated so it will be important to trend labs to ensure avoiding hepatic injury while taking riluzole. Will follow-up with patient in a few months after starting Radicava.     Mental Health   Anxiety  Anxiety has made some improvements with the addition of escitalopram. Reviewed that patient could consider increasing to 20mg daily to see if he finds further benefit.     Seasonal Allergies:   Stable, continue with current regimen.     Pain  Stable, continue with current as needed use of ibuprofen    Constipation   Stable, continue with current regimen.     PLAN:                            Start Radicava ORS:  Dosing: Take 5 mL in the morning upon waking after fasting at least 8 hours and at least 1 hour before your first meal of the day. You may take Radicava at the same time as riluzole and escitalopram.   For month 1, you will take Radicava once daily for 14 days, then will NOT take the medication for 14 days (2 orange bottles)  For subsequent months, you will take Radicava once daily for 10 days, then will NOT take the medication for 18 days (blue bottle)  Using a calendar to iam days you have taken medication can be helpful!  Side effects of Radicava can include: bruising, abnormal gait, and headache.   Shake well before each use and the bottles do not need to be stored in the refrigerator  Additional information about how to take Radicava can  be found here: [email or web address]-ors/#guide  Radicava will be filled at [facility] unless your insurance requires a different pharmacy. They will call you to set up the first order and then they will mail the prescription to your home.    Continue to take riluzole 50mg twice daily  You can try increasing escitalopram to 20mg once daily if you are still experiencing some anxiety.     Follow-up: 11/25/2024 at 1:00 PM via phone call    SUBJECTIVE/OBJECTIVE:                          Ilia Larios is a 39 year old male seen for an initial visit. He was referred to me from Dr. Swna.      Reason for visit: Initial MTM to start Radicava.    Allergies/ADRs: Reviewed in chart  Past Medical History: Reviewed in chart  Tobacco: He reports that he has never smoked. He has never used smokeless tobacco.  Alcohol: 7-9 beverages / week    Medication Adherence/Access: no issues reported    ALS  -Riluzole 50mg twice daily  Started riluzole on May 2024. Patient reports no current medication side effects. Getting labs done today after work to check liver enzymes    Ref Range & Units 6/26/2024   ALT (SGPT)  10 - 50 IU/L 56 High      Ref Range & Units 6/26/2024   AST (SGOT)  10 - 50 IU/L 68 High      Patient completed Radicava [employer] paperwork last week in clinic.   He reports his insurance approved the Radicava prior authorization but is unsure of the price.    Mental Health   Anxiety  Escitalopram 10mg once daily. Has not increased to 2 tablets (20mg) daily. He wasn't sure when he should increase. Reports he was started on this when he was diagnosed with ALS back in May  Patient reports no current medication side effects.  Patient reports symptoms are improved slightly since starting  Patient reports the following stressors: ALS diagnosis     Seasonal Allergies:   -Cetirizine 10mg once daily as needed during allergy season  No reported side effects of  medication    Pain  -Ibuprofen 400mg every 6 hours as needed  Patient reports no current medication side effects    Constipation   -Metamucil once daily     Today's Vitals: There were no vitals taken for this visit.  ----------------      I spent 18 minutes with this patient today. I offer these suggestions for consideration by Dr. Swan. A copy of the visit note was provided to the patient's provider(s).    A summary of these recommendations was sent via [employer].    Lia Chahal, PharmD, BCACP  Medication Therapy Management Pharmacist   [facility]    Telemedicine Visit Details  Type of service:  Telephone visit  Start Time:  1:00 PM  End Time:  1:18 PM     Medication Therapy Recommendations  No medication therapy recommendations to display

## 2024-08-27 NOTE — TELEPHONE ENCOUNTER
Prior Authorization Approval    Medication: RADICAVA ORS STARTER  MG/5ML PO SUSP  Authorization Effective Date: 8/26/2024  Authorization Expiration Date: 2/26/2025  Approved Dose/Quantity: 28 days  Reference #:     Insurance Company: [employer] ([facility]) - Phone [phone] Fax [phone]  Expected CoPay: $    CoPay Card Available:      Financial Assistance Needed: N/A  Which Pharmacy is filling the prescription: [facility] - [address]  Pharmacy Notified: Yes  Patient Notified: Yes        Thank you,    Kim Penny h-T  Specialty Pharmacy Clinic Liaison - CardiologyNeurologyMultiple Sclerosis  [facility]  [address] [place] [id number]  Ph: [phone] Fax: [phone]  [email or web address]

## 2024-08-30 DIAGNOSIS — G12.21 ALS (AMYOTROPHIC LATERAL SCLEROSIS) (H): ICD-10-CM

## 2024-09-03 ENCOUNTER — TELEPHONE (OUTPATIENT)
Dept: NEUROLOGY | Facility: CLINIC | Age: 39
End: 2024-09-03
Payer: COMMERCIAL

## 2024-09-03 RX ORDER — RILUZOLE 50 MG/1
50 TABLET, FILM COATED ORAL EVERY 12 HOURS
Qty: 180 TABLET | Refills: 1 | Status: SHIPPED | OUTPATIENT
Start: 2024-09-03

## 2024-09-03 NOTE — TELEPHONE ENCOUNTER
Health Call Center    Phone Message    May a detailed message be left on voicemail: yes     Reason for Call: Medication Question or concern regarding medication   Prescription Clarification  Name of Medication: Radicava  Prescribing Provider: Ananda Swan   Pharmacy: N/A   What on the order needs clarification?     David the manager at [facility] is requesting a call back with an update on the PA for Radicava and the maintenance dose.     Please review and call back to advise at # [phone].      Action Taken: Message routed to:  [facility] Neurology    Travel Screening: Not Applicable     Date of Service:

## 2024-09-03 NOTE — TELEPHONE ENCOUNTER
Called David back. Left message requesting a call back. Provided my direct phone number.    Lia Chahal, PharmD, BCACP  Medication Therapy Management Pharmacist   [facility]

## 2024-09-04 NOTE — TELEPHONE ENCOUNTER
Spoke with David at [facility] benefits investigation for Radicava. Let him know the medication is approved and prescription has already been sent to appropriate pharmacy. No further action required.    Lia Chahal, PharmD, [facility]  Medication Therapy Management Pharmacist   [facility]

## 2024-09-10 ENCOUNTER — PATIENT OUTREACH (OUTPATIENT)
Dept: CARE COORDINATION | Facility: CLINIC | Age: 39
End: 2024-09-10
Payer: COMMERCIAL

## 2024-09-10 DIAGNOSIS — Z71.89 COUNSELING AND COORDINATION OF CARE: Primary | ICD-10-CM

## 2024-09-11 DIAGNOSIS — F41.1 GAD (GENERALIZED ANXIETY DISORDER): Primary | ICD-10-CM

## 2024-09-11 RX ORDER — ESCITALOPRAM OXALATE 20 MG/1
20 TABLET ORAL DAILY
Qty: 90 TABLET | Refills: 3 | Status: SHIPPED | OUTPATIENT
Start: 2024-09-11

## 2024-09-11 NOTE — PROGRESS NOTES
Social Work -ALS Clinic Progress Note  [facility]    Data/Intervention:    Patient Name:  Ilia Larios  /Age:  1985 (39 year old)    Visit Type: telephone and email    Collaborated With:    -Scar    Psychosocial info/Concerns:   I was out of the office when Scar had his last ALS clinic appt. He emailed some questions about disability and we also spoke by phone.   He is starting to have some difficulty with stairs which impacts him with his work. He is having a good year though, and he feels positive about that. He wants to continue to work now and will make a decision in the future about when he will stop. 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 They have hired a new relator to eventually replace him.   He has a private disability policy and his  suggested that he get started on how to get the policy opened so he is doing that.   He feels he is coping ok. He has up and down days and most of the time feels his worry about the future is managed. They have now told all of their close family and friends. His wife sees a therapist and that is helping her and they talk about the impact of ALS regularly. They have told their children ages 7,9 basic information but not yet about the prognosis. They are carefully considering how/when to do that but for now are waiting.     Intervention/Education/Resources Provided:  Listened and supported. Discussed coping and identified no concerns.  Encouraged him to consider using the [employer] (PA[employer]) who offer free help with navigating disability benefits. His situation is more complex due to the nature of his work.     Assessment/Plan:  Scar overall appears to be doing well despite the challenges and has appropriate supports in place    Provided patient/family with contact information and encouraged them to contact me between clinic visits as needed.     Susie Colunga, MSW, [facility]  Social  [facility] [place]  [phone]

## 2024-11-25 ENCOUNTER — VIRTUAL VISIT (OUTPATIENT)
Dept: PHARMACY | Facility: CLINIC | Age: 39
End: 2024-11-25
Attending: PSYCHIATRY & NEUROLOGY
Payer: COMMERCIAL

## 2024-11-25 DIAGNOSIS — G12.21 ALS (AMYOTROPHIC LATERAL SCLEROSIS) (H): Primary | ICD-10-CM

## 2024-11-25 NOTE — PROGRESS NOTES
Medication Therapy Management (MTM) Encounter    ASSESSMENT:                            Medication Adherence/Access: See below for considerations.    ALS:   Patient is noticing progression of ALS. Reviewed that riluzole and Radicava have been prescribed to help slow the progression of ALS but there is not cure. Reviewed that gait instability could be related to Radicava but would recommend to continue at this time as he is otherwise tolerating and adherent to medication. Followed up with [facility] pharmacy regarding riluzole. When initially filled a few days ago, it was a refill too soon. Prescription went through insurance and will be ready for patient in 1-2 days. Recommend patient continue with riluzole. Patient may benefit from routine PT and OT given his symptoms and will follow-up with Dr. Swan to see what he would recommends.     PLAN:                            Riluzole should be ready at [facility] pharmacy in the next 1-2 days. Please pick that up when it is ready  Continue with Radicava for 10 days on 18 days off. Let me know if you every have issues filling with [facility].  Radicava and riluzole are medications that may help slow the progression of ALS. I will make sure to update Dr. Swan on your symptoms.     Follow-up: May 19th, at 1:00 PM via phone call     SUBJECTIVE/OBJECTIVE:                          Ilia Larios is a 39 year old male seen for a follow-up visit.       Reason for visit: Radicava follow-up.    Allergies/ADRs: Reviewed in chart  Past Medical History: Reviewed in chart  Tobacco: He reports that he has never smoked. He has never used smokeless tobacco.  Alcohol: 7-9 beverages / week    Medication Adherence/Access: no issues reported.    ALS:   -Riluzole 50mg twice daily  Patient reports that [facility] told him it was not covered by his insurance. He has not had any changes in his insurance. He will be out of the medication in 1-2 weeks and requests help  -Radicava 105mg (5mL)  for 10 days out 14 days and then no medication for 14 days. He reports he is taking the medication for 10 days on 18 days off. Filling with [facility] and denies issues with this pharmacy   -Escitalopram 20mg once daily (increased in September) - denies side effects and feels like medication may be helping.     He reports he feels like he is unsteady when walking and does feel like his disease has started to progress pretty fast. He is struggling with walking and balance. Reports he use to work out almost 6 days a week but now is unable to make it to the gym. He is not routinely working with physical therapy, only during ALS clinic. He is also noticing having less mobility in his arms. Left hand is almost unusable and unable to lift this arm. Right arm is starting to get weaker but is still able to use. Denies any issues with eating or swallowing or weight loss. He feels like he has lost muscle mass and is now replaced with fat.     Component  Ref Range & Units 3 mo ago   AST (SGOT)  10 - 50 IU/L 45     Component  Ref Range & Units 3 mo ago   ALT (SGPT)  10 - 50 IU/L 39     Today's Vitals: There were no vitals taken for this visit.  ----------------      I spent 13 minutes with this patient today. I offer these suggestions for consideration by Dr. Swan. A copy of the visit note was provided to the patient's provider(s).    A summary of these recommendations was sent via [employer].    Lia Chahal, PharmD, BCACP  Medication Therapy Management Pharmacist   [facility]    Telemedicine Visit Details  The patient's medications can be safely assessed via a telemedicine encounter.  Type of service:  Telephone visit  Distant Location (provider location):  Off-site  Start Time:  1:00 PM  End Time: 1:13 PM     Medication Therapy Recommendations  No medication therapy recommendations to display

## 2024-11-25 NOTE — Clinical Note
Hi Dr. Swan, I had a MTM follow-up with Ilia today for his ALS medications. He feels his disease has progressed a lot since we met with him in August. He is struggling with walking and balance (could be Radicava?), pretty much unable to use left arm, and right arm is starting to become more weak. I recommended he continue with medication for now as they could be helping slow the progression, he may be even worse without. He seemed a bit frustrated with his progression. Would he benefit from working with PT/OT on a routine basis? Appreciate your input!  Lia Chahal, PharmD, BCACP Medication Therapy Management Pharmacist  [facility]

## 2025-01-07 ENCOUNTER — PATIENT OUTREACH (OUTPATIENT)
Dept: NEUROLOGY | Facility: CLINIC | Age: 40
End: 2025-01-07
Payer: COMMERCIAL

## 2025-01-07 NOTE — PROGRESS NOTES
ALS Care Coordination - Outreach Note    REASON FOR CONTACT:   Clinic Care Coordination - Follow-up (pre-visit)       ASSESSMENT:       1/6/2025   Since last visit   Key event: Hospitalized, skin breakdown, fall? None   Principle concerns: Any new? Rapid progression in arms and legs since last visit   Respiratory, sleep, energy symptoms: Any new? No symptoms   Bulbar symptoms: Any new or worse? Difficulty swallowing   Communication: Any new issues? No symptoms   Gastrostomy: Any concerns? Not applicable, do not have a gastrostomy   Gross motor and mobility: Any new concerns? Increased difficulty walking;Increased difficulty with transfers   Pain/discomfort: Any new symptoms? Muscle cramps   Medication: Taking riluzole, Radicava, or Relyvrio? Riluzole;Radicava   Medication: Have had to stop riluzole, Radicava, or Relyvrio? No, none of these   Medication: Taking Mucinex or Robinul? No   Home care: Currently receiving services? No   Home care: Equipment used at home? AFO brace   Home care: Medical equipment company? n/a        PLAN:   - Patient to follow up in clinic as scheduled.      Cara Larsen LPN  ALS Neurology Care Coordinator  [facility] Neuroscience Service Line

## 2025-01-30 ENCOUNTER — OFFICE VISIT (OUTPATIENT)
Dept: NEUROLOGY | Facility: CLINIC | Age: 40
End: 2025-01-30
Payer: COMMERCIAL

## 2025-01-30 ENCOUNTER — OFFICE VISIT (OUTPATIENT)
Dept: PULMONOLOGY | Facility: CLINIC | Age: 40
End: 2025-01-30
Payer: COMMERCIAL

## 2025-01-30 ENCOUNTER — THERAPY VISIT (OUTPATIENT)
Dept: SPEECH THERAPY | Facility: CLINIC | Age: 40
End: 2025-01-30
Payer: COMMERCIAL

## 2025-01-30 ENCOUNTER — THERAPY VISIT (OUTPATIENT)
Dept: PHYSICAL THERAPY | Facility: CLINIC | Age: 40
End: 2025-01-30
Payer: COMMERCIAL

## 2025-01-30 VITALS
SYSTOLIC BLOOD PRESSURE: 123 MMHG | HEIGHT: 67 IN | RESPIRATION RATE: 16 BRPM | BODY MASS INDEX: 27.72 KG/M2 | DIASTOLIC BLOOD PRESSURE: 86 MMHG | HEART RATE: 76 BPM | WEIGHT: 176.6 LBS | OXYGEN SATURATION: 96 %

## 2025-01-30 DIAGNOSIS — G12.21 ALS (AMYOTROPHIC LATERAL SCLEROSIS) (H): Primary | ICD-10-CM

## 2025-01-30 DIAGNOSIS — G12.21 ALS (AMYOTROPHIC LATERAL SCLEROSIS) (H): ICD-10-CM

## 2025-01-30 DIAGNOSIS — R47.1 DYSARTHRIA: ICD-10-CM

## 2025-01-30 LAB
EXPTIME-PRE: 4.95 SEC
FEF2575-%PRED-PRE: 70 %
FEF2575-PRE: 2.56 L/SEC
FEF2575-PRED: 3.64 L/SEC
FEFMAX-%PRED-PRE: 64 %
FEFMAX-PRE: 6.13 L/SEC
FEFMAX-PRED: 9.52 L/SEC
FEV1-%PRED-PRE: 66 %
FEV1-PRE: 2.4 L
FEV1FEV6-PRE: 82 %
FEV1FEV6-PRED: 82 %
FEV1FVC-PRE: 82 %
FEV1FVC-PRED: 82 %
FIFMAX-PRE: 3.05 L/SEC
FVC-%PRED-PRE: 66 %
FVC-PRE: 2.91 L
FVC-PRED: 4.36 L
MEP-PRE: 63 CMH2O
MIP-PRE: -57 CMH2O

## 2025-01-30 ASSESSMENT — REVISED AMYOTROPHIC LATERAL SCLEROSIS FUNCTIONAL RATING SCALE (ALSFRS-R)
RESPIRATORY_SUBTOTAL_SCORE: 12
TURNING_IN_BED_AND_ADJUSTING_BED_CLOTHES: CAN INITIATE, BUT NOT TURN OR ADJUST SHEETS ALONE
SWALLOWING: NORMAL EATING HABITS
CLIMBING_STAIRS: CANNOT DO
SPEECH: DETECTABLE SPEECH DISTURBANCES
DRESSING_AND_HYGEINE: 1
WALKING: WALKS WITH ASSISTANCE
HANDWRITING: UNABLE TO GRIP PEN
GROSS_MOTOR_SUBTOTAL_SCORE: 3
TURNING_IN_BED_AND_ADJUSTING_BED_CLOTHES: 1
ALSFRS_TOTAL_SCORE: 27
HANDWRITING: 0
CLIMBING_STAIRS: 0
DYSPNEA: 4
WALKING: 2
SALIVATION: 4
SWALLOWING: 4
SALIVATION: NORMAL
ORTHOPENA: 4
SIX_ITEM_SUBTOTAL: 11
CUTTING_FOOD_AND_HANDLING_UTENSILES: 0
DRESSING_AND_HYGEINE: NEEDS ATTENDANT FOR SELF-CARE
BULBAR_SUBTOTAL: 11
SPEECH: 3
FINE_MOTOR_SUBTOTAL_SCORE: 1
RESPIRATORY_INSUFFICIENCY: 4

## 2025-01-30 ASSESSMENT — PAIN SCALES - GENERAL: PAINLEVEL_OUTOF10: NO PAIN (0)

## 2025-01-30 NOTE — LETTER
1/30/2025       RE: Ilia Larios  [address]  [facility] [address]     Dear Colleague,    Thank you for referring your patient, Ilia Larios, to the [facility]. Please see a copy of my visit note below.    King Sullivan MD (PCP)  [place], August 22, 2024     Dear Dr. Sullivan,     I had the pleasure to see Ilia in follow-up at the 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  Details of the diagnosis are summarized my previous note.  He began developing left hand weakness and clumsiness in January to February 2024, followed by right foot weakness and drop.  Neurological examination showed definite upper and lower motor neuron signs in 3 regions and EMG was consistent with diagnosis.  Alternatives were thoroughly excluded by extensive MRI imaging and lab test.  Genetic testing for familial ALS genes was negative.     Compared to his last visit in August 2024, Ilia has experienced a quite rapid and dramatic progression of his weakness in upper and lower extremities.  At the last visit with me on August 2024 he was walking with an AFO and he was not using a cane or walker.  Now, he can hardly get up from a chair even with assistance and he can take more no more than 5-10 steps with a walker.  He has fallen several times and he has started using a manual wheelchair that he purchased.  He has not been fitted for a power wheelchair yet.  His hand weakness is also markedly worse to the point he cannot do most ADLs and he needs help cutting his food or brushing his teeth.  He cannot abduct his shoulders, and the left is almost paralyzed.  This is a major change from last visit.    He does not have a lot of muscle cramps.  He has mild dysphagia in that he has to chew his food more slowly and he is to be more careful to avoid the food getting stuck.   "However he has not noticed any cough or aspiration episodes and he denies difficulties with liquids.  His weight is maintained.  He denies sialorrhea or accumulation of excessive thick secretions.  He does not have prominent pseudobulbar affect.  His speech is still very clear.    He denies dyspnea on exertion, or orthopnea.  He sleeps with 1 pillow at night and denies morning headaches.  He wakes up sometimes due to pain at his back.  However, he believes that his breathing is perhaps a little weaker and more shallow than it used to be.        Current Outpatient Medications   Medication Sig Dispense Refill     cetirizine (ZYRTEC) 10 MG tablet Take 10 mg by mouth daily as needed for allergies.       edaravone (RADICAVA ORS STARTER KIT) 105 MG/5ML suspension Take 5 mLs (105 mg) by mouth daily. For 14 days. Then do not take the medication for 14 days. 70 mL 0     edaravone (RADICAVA ORS) 105 MG/5ML suspension Take 5 mLs (105 mg) by mouth daily. For 10 days out of 14 days. Then do not take medication for 14 days. 50 mL 11     escitalopram (LEXAPRO) 20 MG tablet Take 1 tablet (20 mg) by mouth daily. 90 tablet 3     ibuprofen (ADVIL/MOTRIN) 400 MG tablet Take 400 mg by mouth every 6 hours as needed for moderate pain       Psyllium-Calcium (METAMUCIL PLUS CALCIUM) CAPS Take 1 capsule by mouth daily.       riluzole (RILUTEK) 50 MG tablet TAKE 1 TABLET BY MOUTH EVERY 12 HOURS 180 tablet 1     No current facility-administered medications for this visit.       VITALS: /86 (BP Location: Right arm, Patient Position: Sitting, Cuff Size: Adult Regular)   Pulse 76   Resp 16   Ht 1.71 m (5' 7.32\")   Wt 80.1 kg (176 lb 9.6 oz)   SpO2 96%   BMI 27.40 kg/m        PFT RESULTS: pending      EXAM showed paralysis of the left FDI (0), and strength at most 2 - on the right.  He is wrist extensor strength is 0 on the left and less than antigravity on the right.  Shoulder abduction is 0-1 on the left and 2 on the right.  This is " markedly worse than his last visit.  He has moderate spasticity in the lower extremities and mild in the uppers.  I did not see striking tongue fasciculations but lateral tongue movements were slightly slow.  He did not have dysarthria.  There was no weakness of orbicularis oculi orbicularis oris, or cheek puff..     In summary, Ilia has limb onset ALS with negative genetic testing.  We discussed a number of practical issues.  He has had accelerated progression of his disease between August 2024 and today.  This is faster than his rate of progression between April 2024 and August 2024.  He is wondering if this has anything to do with the Radicava, as he has noted this change after starting this drug.  He did not experience this acceleration with riluzole.  I told him that anecdotally have had a couple of patients in the past that reported the same thing, and while it is not clear that this is due to Radicava and not simply due to disease progression, I recommend stopping this drug.  He agrees.  He will continue the riluzole.    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 This is the criterion for reimbursement of power wheelchair by Medicare.  He does have the cognitive and manual skills required to safely operate a chair and alternative means of supporting his mobility are not appropriate.    He does have lower extremity edema and he may need to do massage, leg elevation at night and consider compression stockings.  For the latter, I asked him to talk to his primary care doctor and request a prescription.  Given that he is largely homebound, he should be eligible for home care and PT and OT due to those rehabilitation issues.    I will also ask him to see our SLP today given his report of dysphagia,  although his bulbar exam is not impressive.    I need to pay close attention to his pulmonary function test results that we will be available at 3 PM today.  If they are markedly worse he may need to start using NIV despite not experiencing any dyspnea on exertion orthopnea at present.    He will return to clinic for follow-up in 3 months, sooner if needed.    Lastly, Scar is experiencing some constipation.  He is not hydrating himself adequately.  I asked him to drink 6 glasses of water a day or more, use ample fruits and vegetables and juice, and if this is not enough he can take a daily Colace.      Sincerely,        Ananda Swan MD, FAAN     The longitudinal plan of care for the diagnosis(es)/condition(s) as documented were addressed during this visit. Due to the added complexity in care, I will continue to support Ilia in the subsequent management and with ongoing continuity of care.     Billing MDM level 4 (moderate) based on 1) Problems assessed: One chronic disorder with progression, exacerbation, or side effects of treatment (ALS) and 2)Risk: prescription drug management, see above.      Again, thank you for allowing me to participate in the care of your patient.      Sincerely,    Ananda Swan MD

## 2025-01-30 NOTE — PROGRESS NOTES
King Sullivan MD (PCP)  [place], August 22, 2024     Dear Dr. 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  Details of the diagnosis are summarized my previous note.  He began developing left hand weakness and clumsiness in January to February 2024, followed by right foot weakness and drop.  Neurological examination showed definite upper and lower motor neuron signs in 3 regions and EMG was consistent with diagnosis.  Alternatives were thoroughly excluded by extensive MRI imaging and lab test.  Genetic testing for familial ALS genes was negative.     Compared to his last visit in August 2024, Ilia has experienced a quite rapid and dramatic progression of his weakness in upper and lower extremities.  At the last visit with me on August 2024 he was walking with an AFO and he was not using a cane or walker.  Now, he can hardly get up from a chair even with assistance and he can take more no more than 5-10 steps with a walker.  He has fallen several times and he has started using a manual wheelchair that he purchased.  He has not been fitted for a power wheelchair yet.  His hand weakness is also markedly worse to the point he cannot do most ADLs and he needs help cutting his food or brushing his teeth.  He cannot abduct his shoulders, and the left is almost paralyzed.  This is a major change from last visit.    He does not have a lot of muscle cramps.  He has mild dysphagia in that he has to chew his food more slowly and he is to be more careful to avoid the food getting stuck.  However he has not noticed any cough or aspiration episodes and he denies difficulties with liquids.  His weight is maintained.  He denies sialorrhea or accumulation of excessive thick secretions.  He does not have prominent pseudobulbar affect.  His speech is still very clear.    He denies dyspnea on  "exertion, or orthopnea.  He sleeps with 1 pillow at night and denies morning headaches.  He wakes up sometimes due to pain at his back.  However, he believes that his breathing is perhaps a little weaker and more shallow than it used to be.        Current Outpatient Medications   Medication Sig Dispense Refill    cetirizine (ZYRTEC) 10 MG tablet Take 10 mg by mouth daily as needed for allergies.      edaravone (RADICAVA ORS STARTER KIT) 105 MG/5ML suspension Take 5 mLs (105 mg) by mouth daily. For 14 days. Then do not take the medication for 14 days. 70 mL 0    edaravone (RADICAVA ORS) 105 MG/5ML suspension Take 5 mLs (105 mg) by mouth daily. For 10 days out of 14 days. Then do not take medication for 14 days. 50 mL 11    escitalopram (LEXAPRO) 20 MG tablet Take 1 tablet (20 mg) by mouth daily. 90 tablet 3    ibuprofen (ADVIL/MOTRIN) 400 MG tablet Take 400 mg by mouth every 6 hours as needed for moderate pain      Psyllium-Calcium (METAMUCIL PLUS CALCIUM) CAPS Take 1 capsule by mouth daily.      riluzole (RILUTEK) 50 MG tablet TAKE 1 TABLET BY MOUTH EVERY 12 HOURS 180 tablet 1     No current facility-administered medications for this visit.       VITALS: /86 (BP Location: Right arm, Patient Position: Sitting, Cuff Size: Adult Regular)   Pulse 76   Resp 16   Ht 1.71 m (5' 7.32\")   Wt 80.1 kg (176 lb 9.6 oz)   SpO2 96%   BMI 27.40 kg/m        PFT RESULTS: pending      EXAM showed paralysis of the left FDI (0), and strength at most 2 - on the right.  He is wrist extensor strength is 0 on the left and less than antigravity on the right.  Shoulder abduction is 0-1 on the left and 2 on the right.  This is markedly worse than his last visit.  He has moderate spasticity in the lower extremities and mild in the uppers.  I did not see striking tongue fasciculations but lateral tongue movements were slightly slow.  He did not have dysarthria.  There was no weakness of orbicularis oculi orbicularis oris, or cheek " josie..     In summary, Ilia has limb onset ALS with negative genetic testing.  We discussed a number of practical issues.  He has had accelerated progression of his disease between August 2024 and today.  This is faster than his rate of progression between April 2024 and August 2024.  He is wondering if this has anything to do with the Radicava, as he has noted this change after starting this drug.  He did not experience this acceleration with riluzole.  I told him that anecdotally have had a couple of patients in the past that reported the same thing, and while it is not clear that this is due to Radicava and not simply due to disease progression, I recommend stopping this drug.  He agrees.  He will continue the riluzole.    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 This is the criterion for reimbursement of power wheelchair by Medicare.  He does have the cognitive and manual skills required to safely operate a chair and alternative means of supporting his mobility are not appropriate.    He does have lower extremity edema and he may need to do massage, leg elevation at night and consider compression stockings.  For the latter, I asked him to talk to his primary care doctor and request a prescription.  Given that he is largely homebound, he should be eligible for home care and PT and OT due to those rehabilitation issues.    I will also ask him to see our SLP today given his report of dysphagia, although his bulbar exam is not impressive.    I need to pay close attention to his pulmonary function test results that we will be available at 3 PM today.  If they are markedly worse he may need to start using NIV despite not experiencing any dyspnea on exertion orthopnea at present.    He will return to clinic  for follow-up in 3 months, sooner if needed.    Lastly, Scar is experiencing some constipation.  He is not hydrating himself adequately.  I asked him to drink 6 glasses of water a day or more, use ample fruits and vegetables and juice, and if this is not enough he can take a daily Colace.      Sincerely,        Ananda Swan MD, FAAN     The longitudinal plan of care for the diagnosis(es)/condition(s) as documented were addressed during this visit. Due to the added complexity in care, I will continue to support Ilia in the subsequent management and with ongoing continuity of care.     Billing MDM level 4 (moderate) based on 1) Problems assessed: One chronic disorder with progression, exacerbation, or side effects of treatment (ALS) and 2)Risk: prescription drug management, see above.

## 2025-01-30 NOTE — PROGRESS NOTES
PHYSICAL THERAPY EVALUATION  Type of Visit: Evaluation    See electronic medical record for Abuse and Falls Screening details.    Subjective       Presenting condition or subjective complaint:    Patient was seen today in ALS clinic. Symptoms first started in January/February 2024 with onset of weakness and loss of dexterity in the left hand, fasciculations, and right LE weakness. He reports increasingly more weakness and difficulty standing. He is unable to transfer on his own. He is only taking steps to transfer. He has lost use of his left arm. He will be moving in April.    Date of onset: 01/30/25    Relevant medical history:     Dates & types of surgery:      Prior diagnostic imaging/testing results:       Prior therapy history for the same diagnosis, illness or injury:        Prior Level of Function  Transfers: Independent  Ambulation: Independent  ADL: Independent  IADL: Childcare, Driving, Finances, Meal preparation, Medication management, Work    Living Environment  Social support:   Spouse and kids  Type of home:    2 story home. Bedroom and bathroom upstairs but pt can stay in a small office and half bath if condition progresses. *They will be moving in late April  Stairs to enter the home:         Ramp:     Stairs inside the home:       yes  Help at home:   spouse, Elise  Equipment owned:   R AFO, 4WW, manual w/c  *Recommended w/c cushion, portable power w/c, power w/c evaluation--they will wait on the power w/c, as they are moving into a new home in April.     Employment:     Realtor  Hobbies/Interests:   spending time with family    Patient goals for therapy:   stretches that can be done for Les and back pain     Pain assessment:  Pain in low back. He reports pain is worst in the middle of the night and first thing in the morning. It is difficult first thing in the morning to stand up straight, but as the day goes on and he is able to get upright more, pain subsides.      Objective    NEURO CLINIC  EVALUATION    Others present at visit: Spouse, Elise     Cardio-respiratory status: Forced vital capacity: 66% of predicted     Height/Weight: 5'7/176 lbs    Technology used: computer, smart phone    ALS FRS:   27/48    Evaluation   Interview completed.     Range of motion:  HC and hamstring tightness bilaterally      Manual muscle testing:  See MD note.      5 Time Sit to Stand:  Unable     10 Meter Walk: Patient is no longer ambulating      Romberg: Not assessed     Gait: Not assessed. Patient is no longer ambulating except to transfer     Cognition:  Intact    Recommended Interventions:  ADL/Self Care/Home Management, Therapeutic Procedures    Treatment provided this date:   ADL/Self Care/Home Management: 5 minutes   Educated patient and spouse on safe transfers with use of gait belt. Requested gait belt with handles from [employer]. Additional mobility/ADL equipment discussed with OT.     Therapeutic Procedures: 8 minutes  -Educated patient and spouse on completing daily stretches for ROM and to address pain/tightness. Instructed in caregiver-assisted heelcord, hamstring, and hip adductor stretches, as well as knee to chest stretch. In addition, recommended trialing prone positioning for 10 minutes (as tolerated) for low back pain secondary to h/o bulging disc.     Response to treatment/recommendations: Patient was receptive and verbalized understanding of all recommendations.    Goal attainment:  All goals met    Total Evaluation Time (Minutes): 8  Timed Code Treatment Minutes: 13  Total Treatment Time (sum of timed and untimed services): 21    Assessment & Plan   CLINICAL IMPRESSIONS  Medical Diagnosis: ALS    Treatment Diagnosis: Force Production Deficit   Impression/Assessment: Patient is a 38 year old male with complaints of muscle weakness and increased fatigue.  The following significant findings have been identified: Decreased strength, Impaired gait, Impaired muscle performance, Decreased activity tolerance,  and Instability. These impairments interfere with their ability to perform self care tasks, work tasks, recreational activities, household chores, and community mobility as compared to previous level of function.     Clinical Decision Making (Complexity):  Clinical Presentation: Evolving/Changing  Clinical Presentation Rationale: based on medical and personal factors listed in PT evaluation  Clinical Decision Making (Complexity): Low complexity    PLAN OF CARE  Treatment Interventions:  Interventions: Therapeutic Exercise, Self-Care/Home Management    Long Term Goals     PT Goal 1  Goal Identifier: Understand evaluation  Goal Description: Patient, family and/or caregiver will verbalize understanding of evaluation results and implications for functional performance.  Target Date: 01/30/25  Date Met: 01/30/25  PT Goal 2  Goal Identifier: Compensatory methods/equipment  Goal Description: Patient, family and/or caregiver will verbalize/demonstrate understanding of compensatory methods/equipment to enhance functional independence and safety  Target Date: 01/30/25  Date Met: 01/30/25  PT Goal 3  Goal Identifier: Positioning/equipment  Goal Description: Patient, family and/or caregiver will verbalize/demonstrate understanding of positioning techniques/equipment  Target Date: 01/30/25  Date Met: 01/30/25  PT Goal 4  Goal Identifier: HEP  Goal Description: Patient, family and/or caregiver will verbalize/demonstrate understanding of home program  Target Date: 01/30/25  Date Met: 01/30/25  PT Goal 5  Goal Identifier: Energy Management  Goal Description: Patient, family and/or caregiver will verbalize energy management techniques appropriate for satus and setting  Target Date: 01/30/25  Date Met: 01/30/25      Frequency of Treatment: 1 time  Duration of Treatment: 1 day    Recommended Referrals to Other Professionals:  Homecare PT/OT  Education Assessment:   Learner/Method: Patient;Caregiver;Listening;Pictures/Video    Risks  and benefits of evaluation/treatment have been explained.   Patient/Family/caregiver agrees with Plan of Care.     Evaluation Time:     PT Cristine, Low Complexity Minutes (71388): 8       Signing Clinician: Monica Santos PT

## 2025-01-30 NOTE — PROGRESS NOTES
SPEECH LANGUAGE PATHOLOGY EVALUATION    See electronic medical record for Abuse and Falls Screening details.    Subjective   Presenting condition or subjective complaint: ALS  Date of onset: diagnosed 5/2/2024  Relevant medical history: Refer to medical record  Prior therapy history for the same diagnosis, illness or injury: patient has been followed in this ALS Multidisciplinary Clinic on average every 3 months however this is first time meeting with clinic SLP.  Others at Clinic Visit and/or support at home: wife Elise  Patient goals for therapy/concerns: Increased speech deficits, Increased swallowing deficits, Augmentative Alternative Communication needs      Objective   Cardio-Respiratory Status: Forced Vital Capacity 66% of predicted  Height/Weight:  5'7 and 176lbs  Functional Rating Scale (ALS-FRS):  27/48  10 Meter Walk test: defer to PT  Speaking Rate: 184 words per minute    Oral Motor/Swallowing  Current Diet/Method of Nutritional Intake: thin liquids (level 0), regular diet      Oral Motor Function:   Anomalies present:    Dentition: natural dentition, adequate dentition  Oral Hygiene: intact  Secretion management: WFL  Mandibular function: intact  Oral labial function:  intact  Lingual function:  intact  Velar function:  intact  Buccal function: adequate  Laryngeal function: cough weak, throat clear intact    Textures Trialed: None    Dysphagia Diagnosis: Swallow within functional limits per patient report  Diet Texture Recommendations: thin liquids (level 0), regular diet  Recommended Feeding/Eating Techniques: see description below  Medication Administration Recommendations: continue whole with liquid wash  Instrumental Assessment Recommendations: instrumental evaluation not recommended at this time    Dysphagia Intervention: Formal assessment/intervention not completed as he reports only occasional difficulties which he describes as very dry foods getting stuck in area of valleculae and requiring a  second swallow to pass. He reports this is only occasional and did not feel formal assessment warranted, SLP agreed.      Speech Intelligibility/Functional Communication   Methods of communication: Verbal    Speech Intelligibility: intact   Respiration Observations: WNL   Phonation: reduced loudness   Resonance: WNL  Rate/prosody: WNL   Articulation: WNL, articulation intact but he reports slightly reduced crispness to enunciation    Speaking Rate: 184 Words per minute per reading of [place] (norm 150-250wpm)  Dysarthria differential diagnosis: WNL   Motor speech level of impairment: no impairment     Communication Intervention: SLP provided education regarding lingual and labial impact on speech and articulation as well as respiratory status on breath support for speech and voice projection. He reports he is not able to project his voice as compared to baseline and he feels changes in speech which are not detectable to others. SLP educated in detail regarding Voice Preservation including reasoning, benefit, and process. Referral was made to the [employer] for equipment and support.      Assessment & Plan   Clinical Impressions  Medical Diagnosis: ALS  Treatment Diagnosis:  Dysarthria, Dysphagia  Impression/Assessment: Pt is a 39 year old male with ALS. The following significant findings have been identified: impaired speech intelligibility, characterized by report of changes to enunciation. Identified deficits interfere with their ability to communicate within the home or community as compared to previous level of function.    Plan of Care  Treatment Interventions:  Speech    Prognosis to achieve stated therapy goals is good   Rehab potential is impacted by: comorbidities    Long Term Goals: Based on today's evaluation session patient and/or caregiver will have understanding of current communication and/or swallowing status and recommendations for management.  Frequency of Treatment: one time only eval  and treat within ALS Interdisciplinary Clinic  Duration of Treatment: one time only eval and treat within ALS Interdisciplinary Clinic  Recommended Referrals to Other Professionals: None     Risks and benefits of evaluation/treatment have been explained.   Patient/Family/caregiver agrees with Plan of Care.     Recommendations:  Complete Voice Preservation with support of ALSA    Education provided/response: Speech  AAC    Treatment provided this date:   Swallow intervention, 0 minutes (see above for details)  Communication intervention, 7 minutes (see above for details)    Response to recommendations/treatment: verbalized understanding, asked appropriate questions, agreed to recommendations    Goal attainment: All goals met    Total Evaluation Time (minutes): 11  Timed Code Treatment (minutes): 0 minutes  Total Treatment Time (sum of timed and untimed services): 7     Present: Not applicable     Signing Clinician: Vi Beltran, SLP

## 2025-01-30 NOTE — PATIENT INSTRUCTIONS
You will see our physical and occupational therapist today and you may be eligible for home care.    For the leg edema I suggest elevating the legs at night and you may use compression stockings.  I would ask your primary care doctor to prescribe them.    You can stop the Radicava, but please continue the riluzole.    I will call you after I review your breathing test results today which is very important.    You will also see our speech therapist for the swallowing issues.    Follow-up sharply in 3 months.    For the constipation, please make sure you take enough vegetables, fruits or juice, and drink at least 6 glasses of water a day.  If this is not enough, you can take an over-the-counter Colace daily.    Please call [facility] @ [phone] for questions or concerns during regular business hours. For a more efficient way to communicate, use [employer] and address the message to your physician. Remember, ikeGPShart is only read during business hours. Do not leave urgent messages on voicemail or [employer]. If situation is urgent, contact the Neurology Clinic @ [phone] and ask to speak to a Triage Nurse or Call 911 or visit an Emergency Department.    Please call your pharmacy if you need a medication refill. They will send us an electronic message.

## 2025-01-30 NOTE — NURSING NOTE
"Chief Complaint   Patient presents with    RECHECK     Return ALS/Motor Neuron       /86 (BP Location: Right arm, Patient Position: Sitting, Cuff Size: Adult Regular)   Pulse 76   Resp 16   Ht 1.71 m (5' 7.32\")   Wt 80.1 kg (176 lb 9.6 oz)   SpO2 96%   BMI 27.40 kg/m          Annie Perez    "

## 2025-01-31 PROBLEM — Z78.9 IMPAIRED MOBILITY AND ADLS: Status: ACTIVE | Noted: 2025-01-31

## 2025-01-31 PROBLEM — Z74.09 IMPAIRED MOBILITY AND ADLS: Status: ACTIVE | Noted: 2025-01-31

## 2025-01-31 PROBLEM — G12.21 ALS (AMYOTROPHIC LATERAL SCLEROSIS) (H): Status: ACTIVE | Noted: 2025-01-31

## 2025-02-03 DIAGNOSIS — G12.21 ALS (AMYOTROPHIC LATERAL SCLEROSIS) (H): Primary | ICD-10-CM

## 2025-02-13 ENCOUNTER — DOCUMENTATION ONLY (OUTPATIENT)
Dept: RESPIRATORY THERAPY | Facility: CLINIC | Age: 40
End: 2025-02-13
Payer: COMMERCIAL

## 2025-02-13 NOTE — PROGRESS NOTES
TREATMENT PLAN AND GOALS:  PATIENT INSTRUCTED ON USE AND CARE OF THE PAP EQUIPMENT IN ACCORDANCE WITH THE [facility] PRACTICE GUIDELINES.  MACHINE MODEL AND MODE:  RESMED  BIPAP ST  PRESSURE SETTING OF: IPAP 8-15, EPAP 4-5, RATE 8-16.  PATIENT WAS SET ON EPAP 5, IPAP 12, RATE OF 12. WILL SENT FOR ORDER CLARIFICATION.       DIAGNOSIS: ALS  MASK TYPE ON RX: ALL     Is patient on home oxygen? No  If yes:  DME Vendor:  Liter Flow:    Is home oxygen being ordered? No  If yes:  Complete O2 Setup Note (if patient chooses ME)  DME Vendor (if not [facility]):  Liter flow:    **QUALIFYING CRITERIA**    NEUROMUSCULAR OR RESTRICTIVE (ONE OF THE FOLLOWING REQUIRED)  1) PACO2 GREATER THAN OR EQUAL TO 45 MMHG   2)  RUKHSANA SPO2 LESS THAN OR EQUAL TO 88% FOR AT LEAST 5 MINS W/2 HR RECORDING ON USUAL FIO2  3) MIP < 60 CMH2O OR FVC < 50% PREDICTED    3) PATIENT HAS A QUALIFY PFT FROM 1/30/2025 WITH A MIP OF (-57).        PATIENT WAS OFFERED CHOICE OF VENDOR AND CHOSE [facility].  PATIENT TO FOLLOW MEDICARE LCD STANDARDS FOR USE REQUIREMENTS AND INSTRUCTED TO FOLLOW UP WITH THEIR PHYSICIAN WITHIN THESE GUIDELINES.  PATIENT INSTRUCTED TO CALL ONE OF OUR LOCATIONS WITH QUESTIONS AND CONCERNS.  PATIENT WILL BE FOLLOWED UP WITH ACCORDING [facility] PROTOCOL.    CAS  ORDER DATE 1/31/2024  NEURO CLINIC   [id number]      DATE PATIENT WAS SETUP ON: 2/12/2025  LOCATION OF SETUP: [facility]  SETUP BY: JONATHAN WRIGHT    2[facility]12[facility]2025-[facility]IFE YULIANA. PATIENT WAS WHEELCHAIR BOUND. INTRODUCED SELF TO PATIENT. REVIEWED MEDICAL NEED FOR DEVICE IN RELATIONS TO HIS ALS DIAGNOSIS. EXPLAINED THE OUTER WORKING OF DEVICE ( ON/OFF, FILTER COMPARTMENT, POWER SUPPLY AND TUBING OUTLET SUPPLY. REVIEWED MASK OPTIONS AND PATIENT DECIDED ON FF F20 MASK. TRIAL FIT MASK AND PATIENT WORE DEVICE FOR ~5MINS WITHOUT DISTRESS. PATIENT STATED IF FELT WEIRD BUT WILL BE SOME HE WILL GET USED TOO. REVIEWED CLEANING AND REORDERING OF  SUPPLIES. REVIEWED COMPLIANCE PER [facility] GUIDELINE. EXPLAINED I WILL FOLLOW UP IN 3, 10 AND 30DAYS AS NEEDED. PATIENT AGREES WITH PLAN. PATIENT SIGNED ALL DOCUMENTS.     1ST MASK CHOICE: F20 AIRTOUCH MEDIUM.     Matt Nichols   Respiratory Therapist   Home Equipment   [phone]

## 2025-03-10 ENCOUNTER — TELEPHONE (OUTPATIENT)
Dept: PHARMACY | Facility: CLINIC | Age: 40
End: 2025-03-10
Payer: COMMERCIAL

## 2025-03-10 NOTE — TELEPHONE ENCOUNTER
Received a fax from [employer] [facility] requesting a prior authorization be completed for Radicava. Patient is no longer taking medication per last visit with Dr. Swan on 1/30/2025. Prior authorization request can be ignored at this time.     Lia Chahal, PharmD, [facility]  Medication Therapy Management Pharmacist   [facility] [place] [facility]

## 2025-03-12 ENCOUNTER — TELEPHONE (OUTPATIENT)
Dept: NEUROLOGY | Facility: CLINIC | Age: 40
End: 2025-03-12
Payer: COMMERCIAL

## 2025-03-12 NOTE — TELEPHONE ENCOUNTER
[facility] Call Center    Phone Message    May a detailed message be left on voicemail: no     Reason for Call: Italo with [facility] calling to inquire if the faxes that he has been sending with the cover my med key was received. Italo stated that Ilia's prior authorization for edaravone (RADICAVA ORS) 105 MG/5ML suspension  on 25 and he has been sending faxes to [phone] with the cover my meds key of [id number].    Action Taken: Message routed to:  [facility] ([facility] NEUROLOGY    Travel Screening: Not Applicable     Date of Service:

## 2025-03-26 ENCOUNTER — DOCUMENTATION ONLY (OUTPATIENT)
Dept: RESPIRATORY THERAPY | Facility: CLINIC | Age: 40
End: 2025-03-26
Payer: COMMERCIAL

## 2025-03-26 NOTE — PROGRESS NOTES
PT WAS GIVEN INSTRUCTION AND EDUCATION ON THE CARE AND MAINTENANCE OF THE COUGH ASSIST UNIT AND ASSOCIATED SUPPLIES IN ACCORDANCE WITH THE [facility] PRACTICE GUIDELINES.  THE COUGH ASSIST MACHINE RECEIVED A QUALITY CHECK FOR PROPER CLEANING AND FUNCTION OF THE UNIT PRIOR TO SET UP ON THE PATIENT.    RX: 10-35 INHALE/EXHALE (TITRATE TO PT COMFORT), I-TIME 1-3 SEC (TITRATE TO PT COMFORT), E-TIME 1-3 SEC (TITRATE TO PT COMFORT), PAUSE 1-3 SEC (TITRATE TO PT COMFORT), OSCILLATION TITRATE TO PT COMFORT  RX SIGNED BY: RETA CARDOZO  DATE SIGNED: 3/21/2025  DIAGNOSIS: ALS  JASSON: 99    DATE PATIENT WAS SETUP ON:  3/2/2025  LOCATION OF SETUP:  PATIENT HOME.   SETUP BY: MATT WRIGHT    3/26/2025-ARRIVED TO PATIENT HOME. PATIENT WIFE GREETED ME AT DOOR. PATIENT WAS IN LIVING ROOM WATCHING TV. INTRODUCED SELF TO PATIENT AND WIFE. REVIEWED MEDICAL NEED FOR DEVICE IN RELATIONS TO HIS DX OF ALS. REVIEWED OUTER WORKING OF DEVICE ( ON/OFF, POWER SUPPLY, FILTER COMPARTMENT, TUBING SUPPLY, TOUCH SCREEN). ASSEMBLED DEVICE AND TRIALED PATIENT ON LOWER PRESSURE. MADE SETTING CHANGE TO ACCOMMODATE BOTH COMFORT AND EFFECTIVENESS. TRIALED OSCALATION ( PATIENT FELT WAS NOT NEEDED AT THIS TIME). REVIEWED CLEANING AND REORDERS OF SUPPLIES.  PATIENT AND WIFE HAD A GOOD UNDERSTAND OF MAKING CHANGES TO SETTING IF NEED. PATIENT WIFE SIGNED ALL DOCUMENTS. PLAN TO FOLLOWUP WITH PATIENT IN 2 DAYS , PATIENT AND WIFE AGREE WITH PLAN.       CURRENT SETTINGS TITRATED TO:  Inhale Pressure: 24 CMH20    Exhale Pressure:  -30 CMH20  I-time: 1.5SEC  E-time: 1.5SEC  Pause time:  3  Oscillation: ON OR OFF ; ___ Hz, ___ cmH2O Amplitude N/A      Matt Nichols   Respiratory Therapist  [facility]   [phone]

## 2025-05-17 ENCOUNTER — HEALTH MAINTENANCE LETTER (OUTPATIENT)
Age: 40
End: 2025-05-17

## 2025-05-19 ENCOUNTER — TELEPHONE (OUTPATIENT)
Dept: PHARMACY | Facility: CLINIC | Age: 40
End: 2025-05-19
Payer: COMMERCIAL

## 2025-05-19 NOTE — TELEPHONE ENCOUNTER
PharmD Outbound Call:     Reason for call:  missed MTM appointment    Call attempt: no answer, left VM for patient to call and reschedule. Provided MTM scheduling line [phone].    Lia Chahal PharmD, BCACP  Medication Therapy Management Pharmacist   [facility]

## 2025-05-20 ENCOUNTER — TELEPHONE (OUTPATIENT)
Dept: NEUROLOGY | Facility: CLINIC | Age: 40
End: 2025-05-20
Payer: COMMERCIAL

## 2025-05-20 DIAGNOSIS — G12.21 ALS (AMYOTROPHIC LATERAL SCLEROSIS) (H): Primary | ICD-10-CM

## 2025-05-20 NOTE — TELEPHONE ENCOUNTER
MTM appointment no showed, we made one more attempt to reschedule.     Routing back to referring provider and MTM Pharmacist Team    [facility]

## 2025-05-21 ENCOUNTER — PATIENT OUTREACH (OUTPATIENT)
Dept: NEUROLOGY | Facility: CLINIC | Age: 40
End: 2025-05-21
Payer: COMMERCIAL

## 2025-05-21 NOTE — PROGRESS NOTES
ALS Care Coordination - Outreach Note    REASON FOR CONTACT:   Clinic Care Coordination - Follow-up (pre-visit)       ASSESSMENT:       5/21/2025   Since last visit   Key event: Hospitalized, skin breakdown, fall? None   Principle concerns: Any new? no   Respiratory, sleep, energy symptoms: Any new? Sleep difficulty;Shortness of breath while at rest;Shortness of breath with activity   Bulbar symptoms: Any new or worse? Excessive saliva;Difficulty swallowing   Communication: Any new issues? Difficulty speaking or difficulty being understood by others   Gastrostomy: Any concerns? Not applicable, do not have a gastrostomy   Gross motor and mobility: Any new concerns? Increased difficulty with transfers   Pain/discomfort: Any new symptoms? Frozen shoulder;Muscle spasms/stiffness   Medication: Taking riluzole, Radicava, or Relyvrio? Riluzole   Medication: Have had to stop riluzole, Radicava, or Relyvrio? No, none of these   Medication: Taking Mucinex or Robinul? No   Home care: Equipment used at home? BiPAP;Cough Assist Machine   Home care: Medical equipment [employer]? [place]        PLAN:   - Patient to follow up in clinic as scheduled.      Cara Larsen LPN  ALS Neurology Care Coordinator  [facility] Neuroscience Service Line

## 2025-05-22 ENCOUNTER — THERAPY VISIT (OUTPATIENT)
Dept: PHYSICAL THERAPY | Facility: CLINIC | Age: 40
End: 2025-05-22
Payer: COMMERCIAL

## 2025-05-22 ENCOUNTER — OFFICE VISIT (OUTPATIENT)
Dept: NEUROLOGY | Facility: CLINIC | Age: 40
End: 2025-05-22
Payer: COMMERCIAL

## 2025-05-22 VITALS
SYSTOLIC BLOOD PRESSURE: 143 MMHG | DIASTOLIC BLOOD PRESSURE: 92 MMHG | HEART RATE: 119 BPM | OXYGEN SATURATION: 93 % | RESPIRATION RATE: 16 BRPM

## 2025-05-22 DIAGNOSIS — G12.21 ALS (AMYOTROPHIC LATERAL SCLEROSIS) (H): Primary | ICD-10-CM

## 2025-05-22 LAB
EXPTIME-PRE: 2.11 SEC
FEF2575-%PRED-PRE: 18 %
FEF2575-PRE: 0.69 L/SEC
FEF2575-PRED: 3.62 L/SEC
FEFMAX-%PRED-PRE: 21 %
FEFMAX-PRE: 2.04 L/SEC
FEFMAX-PRED: 9.51 L/SEC
FEV1-%PRED-PRE: 21 %
FEV1-PRE: 0.76 L
FEV1FEV6-PRE: 87 %
FEV1FEV6-PRED: 82 %
FEV1FVC-PRE: 87 %
FEV1FVC-PRED: 82 %
FIFMAX-PRE: 1.24 L/SEC
FVC-%PRED-PRE: 20 %
FVC-PRE: 0.88 L
FVC-PRED: 4.35 L
MEP-PRE: 20 CMH2O
MIP-PRE: -17 CMH2O

## 2025-05-22 ASSESSMENT — REVISED AMYOTROPHIC LATERAL SCLEROSIS FUNCTIONAL RATING SCALE (ALSFRS-R)
RESPIRATORY_SUBTOTAL_SCORE: 3
RESPIRATORY_INSUFFICIENCY: CONTINUOUS USE OF NIPPV DURING THE NIGHT
ORTHOPENA: UNABLE TO SLEEP WITHOUT MECHANICAL ASSISTANCE
SIX_ITEM_SUBTOTAL: 4
SWALLOWING: DIETARY CONSISTENCY CHANGES
CLIMBING_STAIRS: CANNOT DO
WALKING: NON-AMBULATORY FUNCTIONAL MOVEMENT ONLY
SWALLOWING: 2
TURNING_IN_BED_AND_ADJUSTING_BED_CLOTHES: 0
CLIMBING_STAIRS: 0
TURNING_IN_BED_AND_ADJUSTING_BED_CLOTHES: HELPLESS
SPEECH: INTELLIGIBLE WITH REPEATING
DRESSING_AND_HYGEINE: 0
ORTHOPENA: 0
DRESSING_AND_HYGEINE: TOTAL DEPENDENCE
WALKING: 1
CUTTING_FOOD_AND_HANDLING_UTENSILES: 0
HANDWRITING: 0
SALIVATION: 3
DYSPNEA: OCCURS AT REST: DIFFICULTY BREATHING WHEN EITHER SITTING OR LYING
RESPIRATORY_INSUFFICIENCY: 2
FINE_MOTOR_SUBTOTAL_SCORE: 0
SALIVATION: SLIGHT BUT DEFINITE EXCESS OF SALIVA IN MOUTH; MAY HAVE NIGHTTIME DROOLING
DYSPNEA: 1
BULBAR_SUBTOTAL: 7
SPEECH: 2
ALSFRS_TOTAL_SCORE: 11
HANDWRITING: UNABLE TO GRIP PEN
GROSS_MOTOR_SUBTOTAL_SCORE: 1

## 2025-05-22 NOTE — LETTER
5/22/2025       RE: Ilia Larios  [address]  [place] [address]     Dear Colleague,    Thank you for referring your patient, Ilia Larios, to the [facility] NEUROLOGY CLINIC [place] at [facility]. Please see a copy of my visit note below.    He cinthya Sullivan MD (PCP)  [place], May 22, 2025    Dear Dr Sullivan,    I had the pleasure to see Ilai in follow-up today at the [facility]SA certified motor neuron disease Center of excellence for his  limb onset ALS. He was escorted by his spouse. Symptoms began in January to February 2024 with left hand weakness, followed a few months later by right foot weakness.  Genetic testing for ALS was negative.    Ilia experienced a substantial progression of his upper and lower extremity weakness towards the end of year 2024.  He was placed on riluzole and Radicava but he noted accelerated progression of the disease after starting Radicava ORS, that made us stop it 4 months ago. He continues to take riluzole now.      Ilia has noted further severe progression of his disease in the last 4 months.  He is now short of breath at rest and cannot finish sentences.  He uses BiPAP every night, but he does not use it much during the day. He also has a CoughAssist device, which is helpful.    Ilia is wheelchair-bound and he has a home health aide..  He is also getting PT and OT through [facility].  He had a home care case but is now closed. He does have lower extremity edema that is managed by his PCP; it is better compared to his last visit.    His upper extremities are also paralyzed and he needs help with all ADLs.  He does not complain of particularly painful shoulder, muscle cramps, or spasms at this point.    Regarding bulbar symptoms he reports increasing dysarthria compared to the last visit and he will have occasional choking episodes when eating.  He has difficulty swallowing his pills.   He denies major sialorrhea and he has only occasional phlegm accumulation in his throat.      VITALS:  BP (!) 143/92 (BP Location: Right arm, Patient Position: Sitting, Cuff Size: Adult Regular)   Pulse 119   Resp 16   SpO2 93%         Current Outpatient Medications   Medication Sig Dispense Refill     cetirizine (ZYRTEC) 10 MG tablet Take 10 mg by mouth daily as needed for allergies.       edaravone (RADICAVA ORS STARTER KIT) 105 MG/5ML suspension Take 5 mLs (105 mg) by mouth daily. For 14 days. Then do not take the medication for 14 days. 70 mL 0     edaravone (RADICAVA ORS) 105 MG/5ML suspension Take 5 mLs (105 mg) by mouth daily. For 10 days out of 14 days. Then do not take medication for 14 days. 50 mL 11     escitalopram (LEXAPRO) 20 MG tablet Take 1 tablet (20 mg) by mouth daily. 90 tablet 3     ibuprofen (ADVIL/MOTRIN) 400 MG tablet Take 400 mg by mouth every 6 hours as needed for moderate pain       Psyllium-Calcium (METAMUCIL PLUS CALCIUM) CAPS Take 1 capsule by mouth daily.       riluzole (RILUTEK) 50 MG tablet TAKE 1 TABLET BY MOUTH EVERY 12 HOURS 180 tablet 1     zolpidem (AMBIEN) 5 MG tablet Take 1 tablet (5 mg) by mouth nightly as needed for sleep. 90 tablet 1     No current facility-administered medications for this visit.         Latest Ref Rng & Units 5/22/2025    10:02 AM   PFT   FVC L 0.88  P   FEV1 L 0.76  P   FVC% % 20  P   FEV1% % 21  P      P Preliminary result       MIP -17 cm H2O (very low)    EXAM was not repeated.    IMPRESSION: I explained to Ilia and his spouse that he has very advanced disease. His forced vital capacity is only 20%.  His life expectancy is in the range of <6 months. We discussed advanced directives and goals of care.  He completed the POLST form and he would like to be oriented towards comfort care.  He declined resuscitation or intubation in a terminal event, and does not want tracheostomy.  We will refer him to hospice at his request.  We discussed symptom  management at the end stage of the disease.    If he decides to enroll into hospice, riluzole will be stopped.    He will see our physical and occupational therapist in clinic today.  He does not have new therapy needs.    Regarding his dysphagia, we decided to avoid gastrostomy placement, as the risk is very high with forced vital capacity at 20%, and this procedure is likely going to result to intubation and tracheostomy which Scar would like to avoid.  He does not have a sensation of hunger.  He can continue eating orally for pleasure as needed.    I told him that he can use the noninvasive ventilation as frequently and long as needed during the day    I will see him in follow-up in 3 months, or sooner if needed.  The next visit can be done virtually.      Sincerely,    Ananda Swan MD, FAAN    The longitudinal plan of care for the diagnosis(es)/condition(s) as documented were addressed during this visit. Due to the added complexity in care, I will continue to support Ilia in the subsequent management and with ongoing continuity of care.    Billing MDM level 5 (high) based on 1) Problems assessed: One chronic disorder with severe progression, exacerbation, or side effects of treatment (ALS) and 2)Risk: Decision to de-escalate care due to poor prognosis.    Again, thank you for allowing me to participate in the care of your patient.      Sincerely,    Ananda Swan MD

## 2025-05-22 NOTE — PROGRESS NOTES
He cinthya Sullivan MD (PCP)  [place], May 22, 2025    Dear Dr Sullivan,    I had the pleasure to see Ilia in follow-up today at the [facility] ALSA certified motor neuron disease [facility] of excellence for his  limb onset ALS. He was escorted by his spouse. Symptoms began in January to February 2024 with left hand weakness, followed a few months later by right foot weakness.  Genetic testing for ALS was negative.    Ilia experienced a substantial progression of his upper and lower extremity weakness towards the end of year 2024.  He was placed on riluzole and Radicava but he noted accelerated progression of the disease after starting Radicava ORS, that made us stop it 4 months ago. He continues to take riluzole now.      Ilia has noted further severe progression of his disease in the last 4 months.  He is now short of breath at rest and cannot finish sentences.  He uses BiPAP every night, but he does not use it much during the day. He also has a CoughAssist device, which is helpful.    Ilia is wheelchair-bound and he has a home health aide..  He is also getting PT and OT through [facility].  He had a home care case but is now closed. He does have lower extremity edema that is managed by his PCP; it is better compared to his last visit.    His upper extremities are also paralyzed and he needs help with all ADLs.  He does not complain of particularly painful shoulder, muscle cramps, or spasms at this point.    Regarding bulbar symptoms he reports increasing dysarthria compared to the last visit and he will have occasional choking episodes when eating.  He has difficulty swallowing his pills.  He denies major sialorrhea and he has only occasional phlegm accumulation in his throat.      VITALS:  BP (!) 143/92 (BP Location: Right arm, Patient Position: Sitting, Cuff Size: Adult Regular)   Pulse 119   Resp 16   SpO2 93%         Current Outpatient Medications   Medication Sig Dispense Refill     cetirizine (ZYRTEC) 10 MG tablet Take 10 mg by mouth daily as needed for allergies.      edaravone (RADICAVA ORS STARTER KIT) 105 MG/5ML suspension Take 5 mLs (105 mg) by mouth daily. For 14 days. Then do not take the medication for 14 days. 70 mL 0    edaravone (RADICAVA ORS) 105 MG/5ML suspension Take 5 mLs (105 mg) by mouth daily. For 10 days out of 14 days. Then do not take medication for 14 days. 50 mL 11    escitalopram (LEXAPRO) 20 MG tablet Take 1 tablet (20 mg) by mouth daily. 90 tablet 3    ibuprofen (ADVIL/MOTRIN) 400 MG tablet Take 400 mg by mouth every 6 hours as needed for moderate pain      Psyllium-Calcium (METAMUCIL PLUS CALCIUM) CAPS Take 1 capsule by mouth daily.      riluzole (RILUTEK) 50 MG tablet TAKE 1 TABLET BY MOUTH EVERY 12 HOURS 180 tablet 1    zolpidem (AMBIEN) 5 MG tablet Take 1 tablet (5 mg) by mouth nightly as needed for sleep. 90 tablet 1     No current facility-administered medications for this visit.         Latest Ref Rng & Units 5/22/2025    10:02 AM   PFT   FVC L 0.88  P   FEV1 L 0.76  P   FVC% % 20  P   FEV1% % 21  P      P Preliminary result       MIP -17 cm H2O (very low)    EXAM was not repeated.    IMPRESSION: I explained to Ilia and his spouse that he has very advanced disease. His forced vital capacity is only 20%.  His life expectancy is in the range of <6 months. We discussed advanced directives and goals of care.  He completed the POLST form and he would like to be oriented towards comfort care.  He declined resuscitation or intubation in a terminal event, and does not want tracheostomy.  We will refer him to hospice at his request.  We discussed symptom management at the end stage of the disease.    If he decides to enroll into hospice, riluzole will be stopped.    He will see our physical and occupational therapist in clinic today.  He does not have new therapy needs.    Regarding his dysphagia, we decided to avoid gastrostomy placement, as the risk is very high  with forced vital capacity at 20%, and this procedure is likely going to result to intubation and tracheostomy which Scar would like to avoid.  He does not have a sensation of hunger.  He can continue eating orally for pleasure as needed.    I told him that he can use the noninvasive ventilation as frequently and long as needed during the day    I will see him in follow-up in 3 months, or sooner if needed.  The next visit can be done virtually.      Sincerely,    Ananda Swan MD, FAAN    The longitudinal plan of care for the diagnosis(es)/condition(s) as documented were addressed during this visit. Due to the added complexity in care, I will continue to support Ilia in the subsequent management and with ongoing continuity of care.    Billing MDM level 5 (high) based on 1) Problems assessed: One chronic disorder with severe progression, exacerbation, or side effects of treatment (ALS) and 2)Risk: Decision to de-escalate care due to poor prognosis.

## 2025-05-22 NOTE — NURSING NOTE
Chief Complaint   Patient presents with    RECHECK     Here for a follow up, confirmed with patient     Janice Patricio

## 2025-05-22 NOTE — PATIENT INSTRUCTIONS
You would briefly see our physical and occupational therapist today    I will refer you to [facility]    You can use the BiPAP machine as much as you needed during the day for shortness of breath.    Follow-up in 3 months, which can be a virtual appointment.    I will talk to Cara about referring you for a power wheelchair.

## 2025-05-22 NOTE — PATIENT INSTRUCTIONS
Equipment that I would recommend getting through hospice:  Tilt in space wheelchair   Porfirio Lift  Hospital Bed  For the shower equipment, there may be an option available in the ALS loan pool currently.   Take pictures of the bathroom and send to Viv at the [employer] association  You can also include measurements of doorway widths.       JUAN GardnerT  Physical Therapist  [facility] [place] [address] [place] [address]    Appointments: [phone]

## 2025-05-22 NOTE — PROGRESS NOTES
PHYSICAL THERAPY EVALUATION  Type of Visit: Evaluation    See electronic medical record for Abuse and Falls Screening details.    Subjective       Presenting condition or subjective complaint:    Patient was seen today in ALS clinic with his spouse, Elise. Symptoms first started in January/February 2024 with onset of weakness and loss of dexterity in the left hand, fasciculations, and right LE weakness. He reports a rapid progression of his symptoms. He is no longer able to stand for more than about 20 seconds. He is dependent for all ADLs and positioning. They did move to a 1 level home this spring.   Date of onset: 05/22/25    Relevant medical history:     Dates & types of surgery:      Prior diagnostic imaging/testing results:       Prior therapy history for the same diagnosis, illness or injury:        Prior Level of Function  Transfers: Independent  Ambulation: Independent  ADL: Independent  IADL: Childcare, Driving, Finances, Meal preparation, Medication management, Work    Living Environment  Social support:   Spouse and kids  Type of home:    1 level home; shower is a walk-in  Stairs to enter the home:       2  Ramp:   yes  Stairs inside the home:       no  Help at home:   spouseElise  Equipment owned:   4WW, manual w/c  *Recommended tilt in space manual wheelchair, Porfirio lift, hospital bed, and NuProdx shower system.     Employment:     Realtor  Hobbies/Interests:   spending time with family    Patient goals for therapy:   Determine equipment needs prior to hospice referral    Pain assessment: Denies pain     Objective    NEURO CLINIC EVALUATION    Others present at visit: Spouse, Elise     Cardio-respiratory status: Forced vital capacity: 20% of predicted     Height/Weight: 5'7/176 lbs    Technology used: computer, smart phone    ALS FRS:   11/48    Evaluation   Interview completed.     Range of motion:  HC and hamstring tightness bilaterally      Manual muscle testing: See MD note.      10 Meter  Walk: Patient is no longer ambulating      Gait: Not assessed. Patient is no longer ambulating except to transfer     Cognition:  Intact    Recommended Interventions:  ADL/Self Care/Home Management    Treatment provided this date:    ADL/Self Care/Home Management: 15 minutes   -Collaborated with patient/spouse on equipment needs. Discussed benefits of both a power wheelchair and a manual tilt in space wheelchair.  Due to very quick progression of his disease and the long wait for a power wheelchair, as well as the size and expense of a power chair, he prefers a manual tilt in space wheelchair over a power chair.  Recommended Porfirio lift for transfers to improve safety for both patient and caregiver.   Recommended Hospital bed to improve comfort and ease of positioning in bed.   Recommended NuProdx shower system to allow for ability to porfirio onto the shower seat outside of the bathroom (lack of space to porfirio in the bathroom), and then roll into the bathroom for both the toilet and the shower.   Suggested requesting the hospital bed, manual tilt in space wheelchair, and porfirio lift from hospice.     Requested NuProdx system from \[facility]\"". Patient/spouse will reach out to [employer] representative with pictures of their bathroom to ensure appropriate fit/orientation of NuProdx system.      Response to treatment/recommendations: Patient was receptive and verbalized understanding of all recommendations.    Goal attainment:  All goals met    Total Evaluation Time (Minutes): 14  Timed Code Treatment Minutes: 15  Total Treatment Time (sum of timed and untimed services): 29    Assessment & Plan   CLINICAL IMPRESSIONS  Medical Diagnosis: ALS    Treatment Diagnosis: Force Production Deficit   Impression/Assessment: Patient is a 38 year old male with complaints of muscle weakness and increased fatigue.  The following significant findings have been identified: Decreased strength, Impaired gait, Impaired muscle performance, Decreased  activity tolerance, and Instability. These impairments interfere with their ability to perform self care tasks, work tasks, recreational activities, household chores, and community mobility as compared to previous level of function.     Clinical Decision Making (Complexity):  Clinical Presentation: Evolving/Changing  Clinical Presentation Rationale: based on medical and personal factors listed in PT evaluation  Clinical Decision Making (Complexity): Low complexity    PLAN OF CARE  Treatment Interventions:  Interventions: Therapeutic Exercise, Self-Care/Home Management    Long Term Goals     PT Goal 1  Goal Identifier: Understand evaluation  Goal Description: Patient, family and/or caregiver will verbalize understanding of evaluation results and implications for functional performance.  Target Date: 05/22/25  Date Met: 05/22/25  PT Goal 3  Goal Identifier: Positioning/equipment  Goal Description: Patient, family and/or caregiver will verbalize/demonstrate understanding of positioning techniques/equipment  Target Date: 05/22/25  Date Met: 05/22/25      Frequency of Treatment: 1 time  Duration of Treatment: 1 day    Recommended Referrals to Other Professionals: Homecare PT/OT  Education Assessment:   Learner/Method: Patient;Caregiver;Listening;Pictures/Video    Risks and benefits of evaluation/treatment have been explained.   Patient/Family/caregiver agrees with Plan of Care.     Evaluation Time:     PT Eval, Moderate Complexity Minutes (78725): 14       Signing Clinician: Monica Santos PT

## 2025-05-29 ENCOUNTER — DOCUMENTATION ONLY (OUTPATIENT)
Dept: OTHER | Facility: CLINIC | Age: 40
End: 2025-05-29
Payer: COMMERCIAL

## 2025-07-16 ENCOUNTER — DOCUMENTATION ONLY (OUTPATIENT)
Dept: RESPIRATORY THERAPY | Facility: CLINIC | Age: 40
End: 2025-07-16
Payer: COMMERCIAL

## 2025-07-16 NOTE — PROGRESS NOTES
7/15/2025- Patient wife returned BIPAP and Cough Assist as patient has signed onto hospice and will be supplied by them.     Matt Nichols   Respiratory Therapist   Home Equipment   [phone]

## 2025-08-20 DIAGNOSIS — F41.1 GAD (GENERALIZED ANXIETY DISORDER): ICD-10-CM

## 2025-08-20 RX ORDER — ESCITALOPRAM OXALATE 20 MG/1
20 TABLET ORAL DAILY
Qty: 90 TABLET | Refills: 3 | OUTPATIENT
Start: 2025-08-20

## 2025-08-21 DIAGNOSIS — G12.21 ALS (AMYOTROPHIC LATERAL SCLEROSIS) (H): ICD-10-CM

## 2025-08-21 RX ORDER — RILUZOLE 50 MG/1
50 TABLET, FILM COATED ORAL EVERY 12 HOURS
Qty: 180 TABLET | Refills: 0 | OUTPATIENT
Start: 2025-08-21